# Patient Record
Sex: FEMALE
[De-identification: names, ages, dates, MRNs, and addresses within clinical notes are randomized per-mention and may not be internally consistent; named-entity substitution may affect disease eponyms.]

---
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
## 2020-06-12 ENCOUNTER — HOSPITAL ENCOUNTER (EMERGENCY)
Dept: HOSPITAL 97 - ER | Age: 20
LOS: 1 days | Discharge: HOME | End: 2020-06-13
Payer: COMMERCIAL

## 2020-06-12 DIAGNOSIS — R10.9: ICD-10-CM

## 2020-06-12 DIAGNOSIS — O26.891: Primary | ICD-10-CM

## 2020-06-12 LAB
HCT VFR BLD CALC: 35 % (ref 36–45)
LYMPHOCYTES # SPEC AUTO: 2 K/UL (ref 0.7–4.9)
PMV BLD: 8.7 FL (ref 7.6–11.3)
RBC # BLD: 4.19 M/UL (ref 3.86–4.86)

## 2020-06-12 PROCEDURE — 76817 TRANSVAGINAL US OBSTETRIC: CPT

## 2020-06-12 PROCEDURE — 84702 CHORIONIC GONADOTROPIN TEST: CPT

## 2020-06-12 PROCEDURE — 96361 HYDRATE IV INFUSION ADD-ON: CPT

## 2020-06-12 PROCEDURE — 96360 HYDRATION IV INFUSION INIT: CPT

## 2020-06-12 PROCEDURE — 81003 URINALYSIS AUTO W/O SCOPE: CPT

## 2020-06-12 PROCEDURE — 85025 COMPLETE CBC W/AUTO DIFF WBC: CPT

## 2020-06-12 PROCEDURE — 36415 COLL VENOUS BLD VENIPUNCTURE: CPT

## 2020-06-12 PROCEDURE — 99284 EMERGENCY DEPT VISIT MOD MDM: CPT

## 2020-06-12 PROCEDURE — 86901 BLOOD TYPING SEROLOGIC RH(D): CPT

## 2020-06-12 PROCEDURE — 83690 ASSAY OF LIPASE: CPT

## 2020-06-12 PROCEDURE — 86900 BLOOD TYPING SEROLOGIC ABO: CPT

## 2020-06-12 PROCEDURE — 81025 URINE PREGNANCY TEST: CPT

## 2020-06-12 PROCEDURE — 80048 BASIC METABOLIC PNL TOTAL CA: CPT

## 2020-06-12 NOTE — XMS REPORT
Continuity of Care Document

                            Created on:2020



Patient:VASQUEZ KENNEDY

Sex:Female

:2000

External Reference #:[id number]





Demographics







                          Address                   [address] [place] [address]

 

                          Home Phone                [phone]

 

                          Work Phone                [phone]

 

                          Mobile Phone              [phone]

 

                          Email Address             NONE

 

                          Preferred Language        English

 

                          Marital Status            Unknown

 

                          Amish Affiliation     Unknown

 

                          Race                      Unknown

 

                          Additional Race(s)        Unavailable



                                                    White

 

                          Ethnic Group              Unknown









Author







                          Organization              [facility]

t

 

                          Address                   [address]



                                                    [place] [address]

 

                          Phone                     [phone]









Care Team Providers







                    Name                Role                Phone

 

                    Maura Perez MD    Attending Clinician [phone]

 

                    GIANNI Landa       Attending Clinician [phone]

 

                    Doctor Unassigned,  Name Attending Clinician Unavailable

 

                    GIANNI Burkett MD       Attending Clinician [phone]









Problems

This patient has no known problems.



Allergies, Adverse Reactions, Alerts

This patient has no known allergies or adverse reactions.



Medications

This patient has no known medications.



Procedures

This patient has no known procedures.



Encounters







        Start   End     Encounter Admission Attending Care    Care    Encounter 

Source



        Date/Time Date/Time Type    Type    Clinicians Facility Department ID   

   

 

        2020 Telemedici         Maura Perez [facility]    [id number]

[id number] 



        08:17:40 14:57:25 ne Visit         Judah Damon [id number]         



                                                [place] [id number]         



                                                [facility] [phone]         



                                                [address]                 

 

        2020 Emergency         GALEN Farley    [id number]

[id number] 



        22:56:55 01:29:00                 Kandis Damon [id number]         



                                                [place] [id number]         



                                                [place]  [phone]         



                                                        4             

 

        2020 Orders          Doctor DEGROOT    [id number]

[id number] 



        00:00:00 00:00:00 Only            UnassignedAMMON   [id number]       

  



                                        [place] [id number]         



                                                        [phone]         



                                                        009             

 

        2019 Emergency         GALEN Burkett    [id number]

[id number] 



        08:59:24 11:18:00                 Danny Damon [id number]         



                                                [place] [id number]         



                                                [place]  [phone]         



                                                        084             







Results

This patient has no known results.

## 2020-06-12 NOTE — XMS REPORT
Summary of Care

                            Created on:2020



Patient:Moni Zuñiga

Sex:Female

:2000

External Reference #:[id number]





Demographics







                          Address                   [address] [place] [id number]

 

                          Mobile Phone              [phone]

 

                          Home Phone                [phone]

 

                          Email Address             [email or web address]

 

                          Preferred Language        English

 

                          Marital Status            Single

 

                          Zoroastrianism Affiliation     Unknown

 

                          Race                      White

 

                          Ethnic Group               or 









Author







                          Organization              [facility]

 

                          Address                   [address] [place] [id number]









Care Team Providers







                    Name                Role                Phone

 

                    Pcp,  Does Not Have A Primary Care Provider [phone]









Encounter Details







             Date         Type         Department   Care Team    Description

 

                    2020          Orders Only         [facility]



                          Doctor Unassigned, No     



                                                            [address] [facility]



                                        Name



                                        



                                                [place] [id number] [place] [address] 







Allergies

No Known Allergiesdocumented as of this encounter (statuses as of 2020)



Medications







          Medication Sig       Dispensed Refills   Start Date End Date  Status

 

          ondansetron (ZOFRAN) 4 Take 1 tablet by 12 tablet 0         2019

           Active



          mg tabletIndications: mouth every 8                                   

      



          Non-intractable (eight) hours as                                      

   



          vomiting with nausea, needed for Nausea                               

          



          unspecified vomiting and Vomiting                                     

    



          type, Dizziness (N/V).                                            

 

          meclizine 25 mg Take 1 tablet by 20 tablet 0         2019       

    Active



          tabletIndications: mouth every 6                                      

   



          Non-intractable (six) hours.                                         



          vomiting with nausea,                                                 

  



          unspecified vomiting                                                  

 



          type, Dizziness                                                   



documented as of this encounter (statuses as of 2020)



Active Problems

Not on filedocumented as of this encounter (statuses as of 2020)



Social History







             Tobacco Use  Types        Packs/Day    Years Used   Date

 

             Never Smoker                                        









                          Sex Assigned at Birth     Date Recorded

 

                          Not on file               









                    Job Start Date      Occupation          Industry

 

                    Not on file         Not on file         Not on file









                    Travel History      Travel Start        Travel End









                                        No recent travel history available.



documented as of this encounter



Last Filed Vital Signs

Not on filedocumented in this encounter



Plan of Treatment







                Health Maintenance Due Date        Last Done       Comments

 

                HEPATITIS B VACCINES (1 of 3 - 2000                      



                3-dose primary series)                                 

 

                HEPATITIS A VACCINES (1 of 2 - 2001                      



                2-dose series)                                  

 

                MMR VACCINES (1 of 2 - Standard 2001                      



                series)                                         

 

                DTaP,Tdap,and Td Vaccines (1 - 2007                      



                Tdap)                                           

 

                MENINGOCOCCAL B VACCINES (1 of 2 - 2010                   

   



                Risk Bexsero 2-dose series)                                 

 

                VARICELLA VACCINES (1 of 2 - 13+ 2013                     

 



                2-dose series)                                  

 

                HPV VACCINES (1 - Female 3-dose 2015                      



                series)                                         

 

                CHLAMYDIA SCREENING 2016                      

 

                MENINGOCOCCAL VACCINE (1 - 2-dose 2016                    

  



                series)                                         

 

                INFLUENZA VACCINE (#1) 2019                      

 

                IPV VACCINES    Aged Out                        No longer eligib

le based on



                                                                patient's age to

 complete



                                                                this topic

 

                PNEUMOCOCCAL 0-64 YEARS COMBINED Aged Out                       

 No longer eligible based on



                SERIES                                          patient's age to

 complete



                                                                this topic



documented as of this encounter



Procedures







             Procedure Name Priority     Date/Time    Associated Diagnosis Comme

nts

 

             CONSENT/REFUSAL FOR Routine      2020 10:48 PM CDT           

   



             DIAGNOSIS AND TREATMENT                                        



documented in this encounter



Results

Not on filedocumented in this encounter

## 2020-06-12 NOTE — XMS REPORT
Summary of Care

                            Created on:2020



Patient:Moni Zuñiga

Sex:Female

:2000

External Reference #:[id number]





Demographics







                          Address                   [address] [place] [address]

 

                          Mobile Phone              [phone]

 

                          Home Phone                [phone]

 

                          Phone                     [phone]

 

                          Email Address             [email or web address]

 

                          Preferred Language        English

 

                          Marital Status            Single

 

                          Hindu Affiliation     Unknown

 

                          Race                      White

 

                          Ethnic Group               or 









Author







                          Organization              [facility]

 

                          Address                   [address] [place] [address]









Care Team Providers







                    Name                Role                Phone

 

                    Pcp,  Does Not Have A Primary Care Provider [phone]









Reason for Referral

MRI/CAT Scan (STAT)





           Status     Reason     Specialty  Diagnoses / Referred By Referred To



                                            Procedures Contact    Contact

 

                New Request                     Diagnostic      Diagnoses



Abdominal pain, unspecified abdominal location Kandis Farley,           



                                                Radiology       



Procedures



CT ABDOMEN PELVIS W CONTRAST            PAC



                                        



                                                       [address] [place] [address] 



                                                                 



                                        



                                                       [facility] [place] [place] 



                                                                 [address]



                                        



                                                       Phone:     



                                                                 [phone]



                                        



                                                       Fax:       



                                                       [phone] 









Reason for Visit







                          Reason                    Comments

 

                          Vomiting                  



Auth/Cert





           Status     Reason     Specialty  Diagnoses / Referred By Referred To



                                            Procedures Contact    Contact

 

                                 Emergency Medicine                       [facility]

ergency



                                                                  Dept







                                                                  [address] [place] [address]



                                                                  







                                                                  [place]



                                                                  [address]







                                                                  Phone:



                                                                  [phone]







                                                                  Fax: [phone]









Encounter Details







             Date         Type         Department   Care Team    Description

 

             2020 - Emergency    [facility] Kandis Farley, Non-intract

able vomiting 

with nausea, unspecified vomiting type (Primary Dx);



                    2020                              Department



                                        PAC



                                        Abdominal pain, unspecified abdominal lo

cation;



                                                            [address] [place] [address] 



                                        Nausea and vomiting in adult patient;



                                                            [place] [address]



                                        [place] [address]



                                        Enteritis



                                                [phone] (Fax) 







Allergies

No Known Allergiesdocumented as of this encounter (statuses as of 2020)



Medications







          Medication Sig       Dispensed Refills   Start Date End Date  Status

 

          ondansetron (ZOFRAN) Take 1 tablet by 12 tablet 0         2019  

         Active



          4 mg      mouth every 8                                         



          tabletIndications: (eight) hours as                                   

      



          Non-intractable needed for Nausea                                     

    



          vomiting with nausea, and Vomiting (N/V).                             

            



          unspecified vomiting                                                  

 



          type, Dizziness                                                   

 

          meclizine 25 mg Take 1 tablet by 20 tablet 0         2019       

    Active



          tabletIndications: mouth every 6 (six)                                

         



          Non-intractable hours.                                            



          vomiting with nausea,                                                 

  



          unspecified vomiting                                                  

 



          type, Dizziness                                                   

 

          ondansetron (ZOFRAN) Take 1 tablet by 12 tablet 0         2020  

         Active



          4 mg      mouth every 8                                         



          tabletIndications: (eight) hours as                                   

      



          Nausea and vomiting needed for Nausea                                 

        



          in adult patient and Vomiting (N/V).                                  

       

 

          dicyclomine 20 mg Take 1 tablet by 20 tablet 0         2020     

      Active



          tabletIndications: mouth every 6 (six)                                

         



          Abdominal pain, hours as needed for                                   

      



          unspecified abdominal Abdominal pain.                                 

        



          location                                                    

 

          traMADol (ULTRAM) 50 Take 1 tablet by 20 tablet 0         2020  

         Active



          mg tabletIndications: mouth every 6 (six)                             

            



          Abdominal pain, hours as needed for                                   

      



          unspecified abdominal Pain (scale 7-10).                              

           



          location                                                    



documented as of this encounter (statuses as of 2020)



Active Problems

No known active problemsdocumented as of this encounter (statuses as of 
2020)



Social History







             Tobacco Use  Types        Packs/Day    Years Used   Date

 

             Never Smoker                                        









                          Sex Assigned at Birth     Date Recorded

 

                          Not on file               









                    Job Start Date      Occupation          Industry

 

                    Not on file         Not on file         Not on file









                    Travel History      Travel Start        Travel End









                                        No recent travel history available.



documented as of this encounter



Last Filed Vital Signs







                Vital Sign      Reading         Time Taken      Comments

 

                Blood Pressure  111/64          2020  1:27 AM CDT 

 

                Pulse           90              2020  1:27 AM CDT 

 

                Temperature     37.3 C (99.1 F) 2020 10:58 PM CDT 

 

                Respiratory Rate 16              2020  1:27 AM CDT 

 

                Oxygen Saturation 99%             2020  1:27 AM CDT 

 

                Inhaled Oxygen Concentration -               -               

 

                Weight          99.8 kg (220 lb) 2020 10:58 PM CDT 

 

                Height          160 cm (5' 3")  2020 10:58 PM CDT 

 

                Body Mass Index 38.97           2020 10:58 PM CDT 



documented in this encounter



Discharge Instructions

Danny Johnson MD - 2020Formatting of this note might be 
different from the original.

DIAGNOSIS

Diagnoses that have been ruled out:

None

Diagnoses that are still under consideration:

None

Final diagnoses:

Abdominal pain, unspecified abdominal location

Nausea and vomiting in adult patient

Enteritis



NO LIFE-THREATENING FINDINGS ON TODAY'S EXAM.



PROCEDURES IN THE ER TODAY:

Orders Placed This Encounter

Procedures

 CT ABDOMEN PELVIS W CONTRAST

 Complete Metabolic Panel

 CBC with Differential

 Lipase, Serum

 Urinalysis

 POCT Pregnancy Test

 CBC WITH DIFFERENTIAL



MEDICATIONS ADMINISTERED IN THE ER TODAY AND DISCHARGE MEDICATIONS:

Orders Placed This Encounter

Medications

 sodium chloride (NS) injection 5 mL

 ondansetron (ZOFRAN (PF)) injection 4 mg

 ondansetron (ZOFRAN (PF)) injection 4 mg

 dicyclomine (BENTYL) injection 20 mg

 iohexol (OMNIPAQUE 350 BULK-150 mL) injection 120 mL

 ondansetron (ZOFRAN) 4 mg tablet

 dicyclomine 20 mg tablet

 traMADol (ULTRAM) 50 mg tablet



FOLLOW-UP RECOMMENDATIONS:

RECOMMEND FOLLOW-UP WITH A PRIMARY CARE PROVIDER OR SPECIALIST IN 2-5 DAYS, 
ESPECIALLY IF NO IMPROVEMENT IN SYMPTOMS.



MAY FOLLOW-UP WITH A PROVIDER OF YOUR CHOICE, SUCH AS:

1. A PHYSICIAN OF YOUR CHOICE

2. [facility], [phone]. LOCATIONS IN [facility]

3. [facility], [address] [place] [address]; 
[phone]



OR, IF YOU WISH TO FOLLOW-UP WITHIN THE [facility] HEALTHCARE SYSTEM, MAY TRY THESE 
OPTIONS (CLINIC APPOINTMENTS AVAILABLE ON CASE-BY-CASE BASIS):

1. SCHEDULE AN APPOINTMENT ONLINE AT WWW.[facility]

2. OR CALL THE [facility] ACCESS CENTER AT [phone] OR [phone]

3. OR CALL YOUR [facility] PHYSICIAN'S OFFICE DIRECTLY IF YOU ARE ALREADY AN 
ESTABLISHED [facility] PATIENT.



RETURN TO ER FOR WORSENING OF SYMPTOMS



documented in this encounter



Plan of Treatment







             Name         Type         Priority     Associated Diagnoses Date/Ti

me

 

             CT ABDOMEN PELVIS W IMAGING      STAT         Abdominal pain,  12:48 AM



             CONTRAST                               unspecified abdominal CDT



                                                    location     









                Health Maintenance Due Date        Last Done       Comments

 

                VARICELLA VACCINES (1 of 2 - 2-dose 2001                  

    



                childhood series)                                 

 

                MENINGOCOCCAL B VACCINES (1 of 2 - 2010                   

   



                Risk Bexsero 2-dose series)                                 

 

                DTaP,Tdap,and Td Vaccines (1 - 2011                      



                Tdap)                                           

 

                HPV VACCINES (1 - Female 2-dose 2011                      



                series)                                         

 

                WELL CARE VISIT: 12-21 YEARS 2012                      



                (yearly)                                        

 

                CHLAMYDIA SCREENING 2016                      

 

                INFLUENZA VACCINE (#1) 2019                      

 

                MENINGOCOCCAL VACCINE Aged Out                        No longer 

eligible based on



                                                                patient's age to

 complete



                                                                this topic

 

                PNEUMOCOCCAL 0-64 YEARS COMBINED Aged Out                       

 No longer eligible based on



                SERIES                                          patient's age to

 complete



                                                                this topic



documented as of this encounter



Procedures







             Procedure Name Priority     Date/Time    Associated Diagnosis Comme

nts

 

             CBC WITH DIFFERENTIAL STAT         2020 11:07 Non-intractable

 Results for this



                                       PM CDT       vomiting with procedure are 

in



                                                    nausea, unspecified the resu

lts



                                                    vomiting type section.

 

             URINALYSIS   STAT         2020 11:07 Non-intractable Results 

for this



                                       PM CDT       vomiting with procedure are 

in



                                                    nausea, unspecified the resu

lts



                                                    vomiting type section.

 

             CBC WITH DIFFERENTIAL Routine      2020 11:07 Non-intractable

 Results for this



                                       PM CDT       vomiting with procedure are 

in



                                                    nausea, unspecified the resu

lts



                                                    vomiting type section.

 

             COMP. METABOLIC PANEL STAT         2020 11:07 Non-intractable

 Results for this



             ([id number])                   PM CDT       vomiting with procedure are 

in



                                                    nausea, unspecified the resu

lts



                                                    vomiting type section.

 

             LIPASE       STAT         2020 11:07 Non-intractable Results 

for this



                                       PM CDT       vomiting with procedure are 

in



                                                    nausea, unspecified the resu

lts



                                                    vomiting type section.

 

             POCT PREGNANCY TEST Routine      2020 11:04 Non-intractable R

esults for this



                                       PM CDT       vomiting with procedure are 

in



                                                    nausea, unspecified the resu

lts



                                                    vomiting type section.



documented in this encounter



Results

CBC WITH DIFFERENTIAL (2020 11:07 PM CDT)





             Component    Value        Ref Range    Performed At Pathologist Sig

nature

 

             WBC          11.44 (H)    4.30 - 11.10 [facility] 



                                       10*3/L     hospitals LABORATORY 

 

             RBC          4.41         3.93 - 5.25  [facility] 



                                       10*6/L     HOSPITAL LABORATORY 

 

             HGB          12.3         11.6 - 15.0  [facility] 



                                       g/dL         hospitals LABORATORY 

 

             HCT          37.5         35.7 - 45.2 % [facility] LABORATORY 

 

             MCV          85.0         80.6 - 95.5 fL [facility] LABORATORY 

 

             MCH          27.9         25.9 - 32.8 pg [facility] LABORATORY 

 

             MCHC         32.8         31.6 - 35.1  [facility] 



                                       g/dL         HOSPITAL LABORATORY 

 

             RDW-SD       39.7         39.0 - 49.9 fL [facility] LAB[facility]AT[facility] 

 

             RDW-CV       12.8         12.0 - 15.5 % [facility] LAB[facility] 

 

             PLT          272          166 - 358    [facility] 



                                       10*3/L     HOSPITAL LABORATORY 

 

             MPV          10.6         9.5 - 12.9 fL [facility] LABORATORY 

 

             NRBC/100 WBC 0.0          0.0 - 10.0 /100 [facility] 



                                       WBCs         hospitals LABORATORY 

 

             NRBC x10^3   <0.01        10*3/L     [facility] LABORATORY 

 

             GRAN MAT (NEUT) % 67.8         %            [facility] LABORATORY 

 

             IMM GRAN %   0.50         %            [facility] LAB[facility]AT[facility] 

 

             LYMPH %      23.9         %            [facility] 

 

             MONO %       6.4          %            [facility] LAB[facility] 

 

             EOS %        1.0          %            [facility] LAB[facility] 

 

             BASO %       0.4          %            [facility] LABORAT[facility] 

 

             GRAN MAT x10^3(ANC) 7.75 (H)     1.88 - 7.09  [facility] 



                                       10*3/uL      hospitals LABORATORY 

 

             IMM GRAN x10^3 0.06         0.00 - 0.06  [facility] 



                                       10*3/uL      hospitals LABORATORY 

 

             LYMPH x10^3  2.73         1.32 - 3.29  [facility] 



                                       10*3/uL      hospitals LABORATORY 

 

             MONO x10^3   0.73         0.33 - 0.92  [facility] 



                                       10*3/uL      hospitals LABORATORY 

 

             EOS x10^3    0.12         0.03 - 0.39  [facility] 



                                       10*3/uL      hospitals LABORATORY 

 

             BASO x10^3   0.05         0.01 - 0.07  [facility] 



                                       10*3/uL      hospitals LABORATORY 









                                        Specimen

 

                                        Blood - VENOUS









                Performing Organization Address         City/State/Zipcode Phone

 Number

 

                [facility] CLIA: [id number], [address] [place],  [address] [phone]



                LABORATORY      [address]                  



Urinalysis (2020 11:07 PM CDT)





             Component    Value        Ref Range    Performed At [facility]ologist [facility]

nature

 

             APPEARANCE   Clear        Clear        [facility] 



                                                    LABORATORY   

 

             COLOR        Yellow       Yellow       [facility] 



                                                    LABORATORY   

 

             PH           6.0          4.8 - 8.0    [facility] 



                                                    LABORATORY   

 

             SP GRAVITY   1.019        1.003 - 1.030 [facility] 



                                                    LABORATORY   

 

             GLU U QUAL   Normal       Normal       [facility] 



                                                    LABORATORY   

 

             BLOOD        1+ (A)       Negative     [facility] 



                                                    LABORATORY   

 

             KETONES      Negative     Negative     [facility] 



                                                    LABORATORY   

 

             PROTEIN      Negative     Negative     [facility] 



                                                    LABORATORY   

 

             UROBILIN     Normal       Normal       [facility] 



                                                    LABORATORY   

 

             BILIRUBIN    Negative     Negative     [facility] 



                                                    LABORATORY   

 

             NITRITE      Negative     Negative     [facility] 



                                                    LABORATORY   

 

             LEUK MARLENE   Negative     Negative     [facility] 



                                                    LABORATORY   

 

             RBC/HPF      5 (H)        0 - 3 HPF    [facility] 



                                                    LABORATORY   

 

             WBC/HPF      4            0 - 5 HPF    [facility] 



                                                    LABORATORY   

 

             BACTERIA     Few (A)      Negative     [facility] 



                                                    LABORATORY   

 

             MUCOUS       Slight (A)   Negative LPF [facility] 



                                                    LABORATORY   

 

             SQ EPITH     1            HPF          [facility] 



                                                    LABORATORY   









                                        Specimen

 

                                        Urine - URINE, CLEAN CATCH









                Performing Organization Address         City/[facility] Phone

 Number

 

                [facility] CLIA: [id number], [address] [place], [place] [address]

[address] [phone]



                LABORATORY      [address]                  



Lipase, Serum (2020 11:07 PM CDT)





             Component    Value        Ref Range    Performed At [facility]ologist [facility]

nature

 

             LIPASE       49           0 - 220 U/L  [facility] 



                                                    LABORATORY   









                                        Specimen

 

                                        Blood - VENOUS









                Performing Organization Address         City/[facility] Phone

 Number

 

                [facility] CLIA: [id number], [address] [place] [address]

15 [phone]



                LABORATORY      [address]                  



Complete Metabolic Panel (2020 11:07 PM CDT)





             Component    Value        Ref Range    Performed At Pathologist [facility]

nature

 

             NA           138          135 - 145 mmol/L [facility] LABORATORY 

 

             K            4.1          3.5 - 5.0 mmol/L [facility] LABORAT[facility] 

 

             CL           105          98 - 108 mmol/L [facility] LAB[facility] 

 

             CO2 TOTAL    24           23 - 31 mmol/L [facility] LABORATORY 

 

             AGAP         9            2 - 16       [facility] LABORATORY 

 

             BUN          19           7 - 23 mg/dL [facility] LAB[facility]AT[facility] 

 

             GLUCOSE      95           70 - 110 mg/dL [facility] LABORATORY 

 

             CREATININE   0.79         0.50 - 1.04  [facility] LABORATORY 

 

             TOTAL BILI   0.2          0.1 - 1.1 mg/dL [facility] LABORATORY 

 

             CALCIUM      9.1          8.6 - 10.6 mg/dL [facility] LABORATORY 

 

             T PROTEIN    7.6          6.3 - 8.2 g/dL [facility] LAB[facility]AT[facility] 

 

             ALBUMIN      4.5          3.5 - 5.0 g/dL [facility] LABORAT[facility] 

 

             ALK PHOS     84           34 - 122 U/L [facility] LABORATORY 

 

             ALTv         20           5 - 35 U/L   [facility] LABORATORY 

 

             AST(SGOT)    31           13 - 40 U/L  [facility] LABORATORY 

 

             eGFR Calculation 93.8         mL/min/1.73m2 [facility] 



             (Non-)                           hospitals LABORATOR

Y 

 

             eGFR Calculation 113.6        mL/min/1.73m2 [facility] 



             (African American)                           hospitals LABORATORY 









                                        Specimen

 

                                        Blood - VENOUS









                          Narrative                 Performed At

 

                          Association of Glomerular Filtration Rate (GFR) [facility] 

LABORATORY



                                        and Staging of Kidney Disease*



                                        



                                         



                                        



                          +-----------------------+---------------------+- 



                                        ------------------------+



                                        



                          | GFR (mL/min/1.73 m2) | With Kidney Damage 



                                        | Without Kidney Damage



                                        



                          +-----------------------+---------------------+- 



                                        ------------------------+



                                        



                          | >90         | Stage one 



                              |  Normal        



                                        



                                        



                          +-----------------------+---------------------+- 



                                        ------------------------+



                                        



                          | 60-89        | Stage two 



                                            |  Decreased GFR   

  



                                        



                          +-----------------------+---------------------+- 



                                        ------------------------+



                                        



                          | 30-59        | Stage three 



                                           |  Stage three     

 



                                        



                          +-----------------------+---------------------+- 



                                        ------------------------+



                                        



                          | 15-29        | Stage four 



                                            |  Stage four     

 



                                        



                          +-----------------------+---------------------+- 



                                        ------------------------+



                                        



                          | <15 (or dialysis)  | Stage five   



                                          |  Stage five      



                                        



                          +-----------------------+---------------------+- 



                                        ------------------------+



                                        



                                         



                                        



                          *Each stage assumes the associated GFR level has 



                          been in effect for at least three months. 



                          Stages 1 to 5, with or without kidney disease, 



                                        indicate chronic kidney disease.



                                        



                                         



                                        



                          Notes: Determination of stages one and two (with 



                          eGFR >59mL/min/1.73 m2) requires estimation of 



                          kidney damage for at least three months as 



                          defined by structural or functional 



                          abnormalities of the kidney, manifested by 



                                        either:



                                        



                          Pathological abnormalities or Markers of kidney 



                          damage (including abnormalities in the 



                          composition of the blood or urine or 



                                        abnormalities in imaging tests). 



                                        



                                                    









                Performing Organization Address         City/State/Zipcode Phone

 Number

 

                [facility] CLIA: [id number], [address] [address] 15 [phone]



                [facility]                  



POCT Pregnancy Test (2020 11:04 PM CDT)





             Component    Value        Ref Range    Performed At Pathologist Sig

nature

 

             POCT PREG    negative                               

 

             On board controls acceptable positive                              

 



             with C Line                                         

 

             POCT PREG LOT # [id number]                             

 

             POCT PREG TEST  DATE 2021                             









                                        Specimen

 

                                        Urine - URINE, CLEAN CATCH



documented in this encounter



Visit Diagnoses







                                        Diagnosis

 

                                        Non-intractable vomiting with nausea, un

specified vomiting type - Primary

 

                                        Abdominal pain, unspecified abdominal lo

cation

 

                                        Enteritis







                                        Other and unspecified noninfectious shanthi

roenteritis and colitis



documented in this encounter



Administered Medications







           Medication Order MAR Action Action Date Dose       Rate       Site

 

                                        sodium chloride (NS) injection 5 mL



             Given        2020 11:07 PM CDT 5 mL                      



           5 mL, Intravenous, PRN, Starting                                     

        



           Tue 3/24/20 at 2259, Until                                           

  



           Discontinued, Routine, IV line                                       

      



           flushing                                               









                                                    









                                        









           Medication Order MAR Action Action Date Dose       Rate       Site

 

           dicyclomine (BENTYL) Given      2020 11:32 PM 20 mg            

     Right



                                        injection 20 mg



                          CDT                                    Dorsogluteal-IM



           20 mg, Intramuscular,                                             



           ONCE, 1 dose, Tue 3/24/20                                            

 



           at 2330, Routine                                             









                                                    









           iohexol (OMNIPAQUE 350 BULK-150 mL) Given      2020  1:00 AM CD

T 120 mL                



                                        injection 120 mL



                                                                 



           120 mL, Intravenous, ONCE, 1 dose, Wed                               

              



           3/25/20 at 0100, Routine                                             









                                                    









                                        ondansetron (ZOFRAN (PF)) injection 4 mg



             Given        2020 11:07 PM CDT 4 mg                      



           4 mg, Slow IV Push, ONCE, 1 dose, Wed 3/25/20                        

                     



           at 0015, ASAP                                             









                                                    









                                        ondansetron (ZOFRAN (PF)) injection 4 mg



             Given        2020 11:22 PM CDT 4 mg                      



           4 mg, Slow IV Push, ONCE, 1 dose, Wed 3/25/20                        

                     



           at 0030, ASAP                                             









                                                    



documented in this encounter
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
## 2020-06-13 VITALS — TEMPERATURE: 97.8 F | OXYGEN SATURATION: 100 %

## 2020-06-13 VITALS — SYSTOLIC BLOOD PRESSURE: 103 MMHG | DIASTOLIC BLOOD PRESSURE: 47 MMHG

## 2020-06-13 LAB
BUN BLD-MCNC: 11 MG/DL (ref 7–18)
GLUCOSE SERPLBLD-MCNC: 90 MG/DL (ref 74–106)
HCG SERPL-ACNC: (no result) MIU/ML (ref 1–3)
LIPASE SERPL-CCNC: 76 U/L (ref 73–393)
POTASSIUM SERPL-SCNC: 3.8 MMOL/L (ref 3.5–5.1)

## 2020-06-13 NOTE — ER
Nurse's Notes                                                                                     

 [facility]                                                                 

Name: Moni Zuñiga                                                                              

Age: 19 yrs                                                                                       

Sex: Female                                                                                       

: 2000                                                                                   

MRN: [id number]                                                                                   

Arrival Date: 2020                                                                          

Time: 22:49                                                                                       

Account#: [id number]                                                                            

Bed 17                                                                                            

Private MD:                                                                                       

Diagnosis: Pregnancy related conditions, unspecified, first trimester;Unspecified abdominal pain  

                                                                                                  

Presentation:                                                                                     

                                                                                             

23:00 Chief complaint: Patient states: Lower abd pain that began this morning, pt reports     sg  

      pain and cramping increasing this evening, denies N/VD/Fever at this time, reports          

      vaginal bleeding that is spotty. Coronavirus screen: Proceed with normal triage. Ebola      

      Screen: Patient negative for fever greater than or equal to 101.5 degrees Fahrenheit,       

      and additional compatible Ebola Virus Disease symptoms Patient denies exposure to           

      infectious person. Patient denies travel to an Ebola-affected area in the 21 days           

      before illness onset. No symptoms or risks identified at this time. Initial Sepsis          

      Screen: Does the patient meet any 2 criteria? No. Patient's initial sepsis screen is        

      negative. Does the patient have a suspected source of infection? No. Patient's initial      

      sepsis screen is negative. Risk Assessment: Do you want to hurt yourself or someone         

      else? Patient reports no desire to harm self or others. Onset of symptoms was 2020. Care prior to arrival: None. Transition of care: patient was not received from        

      another setting of care.                                                                    

23:00 Method Of Arrival: Ambulatory                                                             

23:00 Acuity: SKYLA 3                                                                           sg  

                                                                                                  

OB/GYN:                                                                                           

23:03  1, Full Term 0, Premature 0,  0, Living 0, LMP 2020                  

23:28  1, Full Term 0,  0, Living 0, LMP 2020, Pregnancy Verified, EDC    cp  

      2021, Gestational age from LMP: 8 weeks 1 day                                          

                                                                                                  

Historical:                                                                                       

- Allergies:                                                                                      

23:02 No Known Allergies;                                                                     sg  

- Home Meds:                                                                                      

23:02 Prenatal Vitamin Oral [Active];                                                         sg  

- PMHx:                                                                                           

                                                                                             

00:26 None;                                                                                   sg  

- PSHx:                                                                                           

                                                                                             

23:02 None;                                                                                   sg  

                                                                                                  

- Immunization history:: Adult Immunizations.                                                     

- Social history:: Smoking status: Patient denies any tobacco usage or history of.                

                                                                                                  

                                                                                                  

Screenin:30 Abuse screen: Denies threats or abuse. Denies injuries from another. Nutritional          

      screening: No deficits noted. Tuberculosis screening: No symptoms or risk factors           

      identified. Fall Risk None identified.                                                      

                                                                                                  

Assessment:                                                                                       

23:30 General: Appears in no apparent distress. Behavior is calm, cooperative, appropriate    wh  

      for age. Pain: Complains of pain in umbilical area Pain does not radiate. Pain              

      currently is 2 out of 10 on a pain scale. Neuro: Level of Consciousness is awake,           

      alert, obeys commands, Oriented to person, place, time, situation, Appropriate for age.     

      Cardiovascular: Heart tones S1 S2. Respiratory: Airway is patent Respiratory effort is      

      even, unlabored, Respiratory pattern is regular, symmetrical, Breath sounds are clear       

      bilaterally. GI: Abdomen is flat, non-distended, Bowel sounds present X 4 quads. Abd is     

      soft and non tender X 4 quads. Reports lower abdominal pain. : No signs and/or            

      symptoms were reported regarding the genitourinary system. EENT: No signs and/or            

      symptoms were reported regarding the EENT system. Derm: Skin is intact, is healthy with     

      good turgor, Skin is pink, warm \T\ dry. normal. Musculoskeletal: Circulation, motion,      

      and sensation intact.                                                                       

                                                                                             

01:00 Reassessment: Patient appears in no apparent distress at this time. No changes from       

      previously documented assessment. Patient and/or family updated on plan of care and         

      expected duration. Pain level reassessed. Patient is alert, oriented x 3, equal             

      unlabored respirations, skin warm/dry/pink.                                                 

                                                                                                  

Vital Signs:                                                                                      

                                                                                             

23:00 Pulse 87; Resp 18; Temp 97.8; Pulse Ox 100% on R/A; Pain 7/10;                          sg  

                                                                                             

01:00  / 47; Pulse 86; Resp 18; Pulse Ox 100% on R/A;                                     

                                                                                                  

ED Course:                                                                                        

                                                                                             

22:49 Patient arrived in ED.                                                                  ds1 

23:02 Kane Chester PA is PHCP.                                                                cp  

23:02 Ozzie Pat MD is Attending Physician.                                            cp  

23:02 Triage completed.                                                                       sg  

23:02 Arm band placed on.                                                                     sg  

23:05 Peter Dubose is Primary Nurse.                                                         wh  

23:30 Patient has correct armband on for positive identification. Placed in gown. Bed in low  wh  

      position. Call light in reach. Side rails up X 1. Pulse ox on. NIBP on.                     

23:30 Inserted saline lock: 20 gauge in right antecubital area, using aseptic technique.        

      Blood collected.                                                                            

                                                                                             

01:13 US Transvaginal Ob In Process Unspecified.                                              EDMS

01:24 No provider procedures requiring assistance completed. IV discontinued, intact,           

      bleeding controlled, No redness/swelling at site.                                           

                                                                                                  

Administered Medications:                                                                         

                                                                                             

23:43 Drug: NS 0.9% 1000 ml Route: IV; Rate: 1000 ml/hr; Site: right antecubital;               

                                                                                             

01:19 Follow up: Response: No adverse reaction; IV Status: Completed infusion                   

                                                                                                  

                                                                                                  

Outcome:                                                                                          

01:09 Discharge ordered by MD.                                                                cp  

01:24 Discharged to home ambulatory, with family.                                               

01:24 Condition: stable                                                                           

01:24 Discharge instructions given to patient, family, Instructed on discharge instructions,      

      follow up and referral plans. POC Demonstrated understanding of instructions, follow-up     

      care, POC                                                                                   

01:25 Patient left the ED.                                                                      

                                                                                                  

Signatures:                                                                                       

Dispatcher [employer]                           EDMS                                                 

Hitesh Vega RN RN sg [place], Demi                                ds1                                                  

Kane Chester PA PA cp Habalo, Winsy                                                                                   

                                                                                                  

**************************************************************************************************

## 2020-06-13 NOTE — RAD REPORT
EXAM DESCRIPTION:  US - Transvaginal OB - 6/13/2020 1:13 am

 

CLINICAL HISTORY:   Pregnancy with pelvic pain

 

COMPARISON:  None.

 

FINDINGS:   The uterus measures 9 x 6 x 5 centimeters. A normal appearing gestational sac is present 
within the endometrium. Within this is a yolk sac and fetal pole with a crown-rump length 1.8 centime
ters. Cardiac activity 169 beats per minute

 

Right and left ovary appear normal. . An adnexal mass is not noted.

 

No significant free fluid is seen.

 

IMPRESSION:   Single live intrauterine pregnancy with an estimated gestational age 8 weeks 1 days ANGELIQUE
 01/22/2021

## 2020-06-13 NOTE — EDPHYS
Physician Documentation                                                                           

 [facility]                                                                 

Name: Moni Zuñiga                                                                              

Age: 19 yrs                                                                                       

Sex: Female                                                                                       

: 2000                                                                                   

MRN: [id number]                                                                                   

Arrival Date: 2020                                                                          

Time: 22:49                                                                                       

Account#: [id number]                                                                            

Bed 17                                                                                            

Private MD:                                                                                       

ED Physician Ozzie Pat                                                                     

HPI:                                                                                              

                                                                                             

23:25 This 19 yrs old  Female presents to ER via Ambulatory with complaints of        cp  

      Abdominal Pain - 9 Wks Preg.                                                                

23:25 The patient presents with abdominal pain in the periumbilical area. Onset: The          cp  

      symptoms/episode began/occurred today. Associated signs and symptoms: Pertinent             

      negatives: dysuria, fever, vomiting, vaginal bleeding, leakage of fluids. Severity of       

      pain: in the emergency department the pain is unchanged despite home interventions.         

                                                                                                  

OB/GYN:                                                                                           

23:03  1, Full Term 0, Premature 0,  0, Living 0, LMP 2020                sg  

23:28  1, Full Term 0,  0, Living 0, LMP 2020, Pregnancy Verified, EDC    cp  

      2021, Gestational age from LMP: 8 weeks 1 day                                          

                                                                                                  

Historical:                                                                                       

- Allergies:                                                                                      

23:02 No Known Allergies;                                                                     sg  

- Home Meds:                                                                                      

23:02 Prenatal Vitamin Oral [Active];                                                         sg  

- PMHx:                                                                                           

                                                                                             

00:26 None;                                                                                   sg  

- PSHx:                                                                                           

                                                                                             

23:02 None;                                                                                   sg  

                                                                                                  

- Immunization history:: Adult Immunizations.                                                     

- Social history:: Smoking status: Patient denies any tobacco usage or history of.                

                                                                                                  

                                                                                                  

ROS:                                                                                              

23:27 Constitutional: Negative for body aches, chills, fever, poor PO intake.                 cp  

23:27 Eyes: Negative for injury, pain, redness, and discharge.                                cp  

23:27 Cardiovascular: Negative for chest pain, palpitations.                                      

23:27 Respiratory: Negative for cough, shortness of breath, wheezing.                             

23:27 Abdomen/GI: Positive for abdominal pain, Negative for nausea, vomiting, and diarrhea.       

23:27 : Negative for urinary symptoms, vaginal bleeding, vaginal discharge.                     

23:27 Neuro: Negative for dizziness, headache.                                                    

23:27 All other systems are negative.                                                         cp  

                                                                                                  

Exam:                                                                                             

23:30 Constitutional: The patient appears in no acute distress, alert, awake, non-toxic, well cp  

      developed, well nourished.                                                                  

23:30 Head/Face:  Normocephalic, atraumatic.                                                  cp  

23:30 Eyes: Periorbital structures: appear normal, Conjunctiva: normal, no exudate, no            

      injection, Sclera: no appreciated abnormality, Lids and lashes: appear normal,              

      bilaterally.                                                                                

23:30 ENT: External ear(s): are unremarkable, Nose: is normal, Mouth: is normal, Posterior        

      pharynx: is normal, airway is patent, no erythema, no exudate.                              

23:30 Chest/axilla: Inspection: normal, Palpation: is normal, no crepitus, no tenderness.         

23:30 Cardiovascular: Rate: normal, Rhythm: regular.                                              

23:30 Respiratory: the patient does not display signs of respiratory distress,  Respirations:     

      normal, no use of accessory muscles, no retractions, labored breathing, is not present,     

      Breath sounds: are clear throughout, no decreased breath sounds.                            

23:30 Abdomen/GI: Inspection: abdomen appears normal, Bowel sounds: active, all quadrants,        

      Palpation: soft, in all quadrants, mild abdominal tenderness, in the umbilical area,        

      rebound tenderness, is not appreciated, voluntary guarding, is not appreciated,             

      involuntary guarding, is not appreciated.                                                   

23:30 Back: pain, is absent, ROM is normal.                                                       

                                                                                                  

Vital Signs:                                                                                      

23:00 Pulse 87; Resp 18; Temp 97.8; Pulse Ox 100% on R/A; Pain 7/10;                          sg  

                                                                                             

01:00  / 47; Pulse 86; Resp 18; Pulse Ox 100% on R/A;                                   wh  

                                                                                                  

MDM:                                                                                              

                                                                                             

23:06 Patient medically screened.                                                             cp  

23:50 Differential diagnosis: appendicitis, Ectopic Pregnancy, non-specific abd pain, Ovarian cp  

      Torsion, Pelvic Inflammatory Disease, Pyelonephritis, urinary tract infection.              

                                                                                             

01:08 Data reviewed: vital signs, nurses notes, lab test result(s), radiologic studies,       cp  

      ultrasound.                                                                                 

01:08 Counseling: I had a detailed discussion with the patient and/or guardian regarding: the cp  

      historical points, exam findings, and any diagnostic results supporting the                 

      discharge/admit diagnosis, lab results, radiology results, to return to the emergency       

      department if symptoms worsen or persist or if there are any questions or concerns that     

      arise at home.                                                                              

                                                                                                  

                                                                                             

23:22 Order name: Urine Dipstick--Ancillary (enter results); Complete Time: 23:46             ar5 

                                                                                             

23:22 Order name: Urine Pregnancy--Ancillary (enter results); Complete Time: 23:46            [facility] 

                                                                                             

23:27 Order name: Quantitative Hcg; Complete Time: 00:29                                        

                                                                                             

00:29 Interpretation: HCGQ 85273; Reviewed.                                                     

                                                                                             

23:27 Order name: Abo/rh Typing; Complete Time: 00:29                                           

                                                                                             

23:27 Order name: Basic Metabolic Panel; Complete Time: 00:29                                   

                                                                                             

00:29 Interpretation: Normal except: .                                                    

                                                                                             

23:27 Order name: CBC with Diff; Complete Time: 00:29                                           

                                                                                             

00:29 Interpretation: Normal except: WBC 11.6; HGB 11.5; HCT 35.0; YENIFER% 75.8; NEUT A 8.8.       

                                                                                             

23:08 Order name: Urine Dipstick-Ancillary (obtain specimen); Complete Time: 23:21              

                                                                                             

23:08 Order name: Urine Pregnancy Test (obtain specimen); Complete Time: 23:21                  

                                                                                             

23:27 Order name: IV Saline Lock; Complete Time: 23:43                                          

                                                                                             

23:27 Order name: Labs collected and sent; Complete Time: 23:43                                 

                                                                                             

23:27 Order name: NPO; Complete Time: 23:43                                                     

                                                                                             

23:27 Order name: Lipase; Complete Time: 00:29                                                  

                                                                                             

00:29 Interpretation: Reviewed.                                                                 

                                                                                             

23:46 Order name: US Transvaginal Ob                                                          cp  

                                                                                                  

Administered Medications:                                                                         

                                                                                             

23:43 Drug: NS 0.9% 1000 ml Route: IV; Rate: 1000 ml/hr; Site: right antecubital;               

                                                                                             

01:19 Follow up: Response: No adverse reaction; IV Status: Completed infusion                   

                                                                                                  

                                                                                                  

Disposition:                                                                                      

03:17 Co-signature as Attending Physician, Ozzie Pat MD I agree with the assessment and [facility] 

      plan of care.                                                                               

                                                                                                  

Disposition:                                                                                      

20 01:09 Discharged to Home. Impression: Pregnancy related conditions, unspecified,         

  first trimester, Unspecified abdominal pain.                                                    

- Condition is Stable.                                                                            

- Discharge Instructions: Abdominal Pain During Pregnancy.                                        

                                                                                                  

- Medication Reconciliation Form, Thank You Letter, Antibiotic Education, Prescription            

  Opioid Use form.                                                                                

- Follow up: Private Physician; When: 2 - 3 days; Reason: Recheck today's complaints.             

- Problem is new.                                                                                 

- Symptoms have improved.                                                                         

                                                                                                  

                                                                                                  

                                                                                                  

Signatures:                                                                                       

Dispatcher [employer]                           EDMS                                                 

Hitesh Vega RN                         RN   Kane Koenig PA PA cp Habalo, Winsy wh Wadley, Terrence, MD MD   [id number]                                                  

                                                                                                  

Corrections: (The following items were deleted from the chart)                                    

01:25 01:09 2020 01:09 Discharged to Home. Impression: Pregnancy related conditions,    wh  

      unspecified, first trimester; Unspecified abdominal pain. Condition is Stable. Forms        

      are Medication Reconciliation Form, Thank You Letter, Antibiotic Education,                 

      Prescription Opioid Use. Follow up: Private Physician; When: 2 - 3 days; Reason:            

      Recheck today's complaints. Problem is new. Symptoms have improved. cp                      

                                                                                                  

**************************************************************************************************

## 2023-02-05 ENCOUNTER — HOSPITAL ENCOUNTER (EMERGENCY)
Dept: HOSPITAL 97 - ER | Age: 23
Discharge: HOME | End: 2023-02-05
Payer: COMMERCIAL

## 2023-02-05 VITALS — OXYGEN SATURATION: 100 % | SYSTOLIC BLOOD PRESSURE: 112 MMHG | DIASTOLIC BLOOD PRESSURE: 84 MMHG

## 2023-02-05 VITALS — TEMPERATURE: 98.5 F

## 2023-02-05 DIAGNOSIS — O26.892: Primary | ICD-10-CM

## 2023-02-05 DIAGNOSIS — Z3A.16: ICD-10-CM

## 2023-02-05 LAB
ALBUMIN SERPL BCP-MCNC: 3.1 G/DL (ref 3.4–5)
ALP SERPL-CCNC: 73 U/L (ref 45–117)
ALT SERPL W P-5'-P-CCNC: 47 U/L (ref 13–56)
AST SERPL W P-5'-P-CCNC: 24 U/L (ref 15–37)
BUN BLD-MCNC: 7 MG/DL (ref 7–18)
GLUCOSE SERPLBLD-MCNC: 104 MG/DL (ref 74–106)
HCT VFR BLD CALC: 37.7 % (ref 36–45)
LIPASE SERPL-CCNC: 94 U/L (ref 73–393)
LYMPHOCYTES # SPEC AUTO: 2.2 K/UL (ref 0.7–4.9)
MCV RBC: 83.9 FL (ref 80–100)
PMV BLD: 9 FL (ref 7.6–11.3)
POTASSIUM SERPL-SCNC: 3.8 MMOL/L (ref 3.5–5.1)
RBC # BLD: 4.49 M/UL (ref 3.86–4.86)
SP GR UR: >1.03 (ref 1–1.03)

## 2023-02-05 PROCEDURE — 99284 EMERGENCY DEPT VISIT MOD MDM: CPT

## 2023-02-05 PROCEDURE — 85025 COMPLETE CBC W/AUTO DIFF WBC: CPT

## 2023-02-05 PROCEDURE — 83690 ASSAY OF LIPASE: CPT

## 2023-02-05 PROCEDURE — 96361 HYDRATE IV INFUSION ADD-ON: CPT

## 2023-02-05 PROCEDURE — 81025 URINE PREGNANCY TEST: CPT

## 2023-02-05 PROCEDURE — 96374 THER/PROPH/DIAG INJ IV PUSH: CPT

## 2023-02-05 PROCEDURE — 76770 US EXAM ABDO BACK WALL COMP: CPT

## 2023-02-05 PROCEDURE — 80053 COMPREHEN METABOLIC PANEL: CPT

## 2023-02-05 PROCEDURE — 81003 URINALYSIS AUTO W/O SCOPE: CPT

## 2023-02-05 PROCEDURE — 96372 THER/PROPH/DIAG INJ SC/IM: CPT

## 2023-02-05 PROCEDURE — 36415 COLL VENOUS BLD VENIPUNCTURE: CPT

## 2023-02-05 NOTE — RAD REPORT
EXAM DESCRIPTION:  US - Renal Ultrasound-Complete - 2/5/2023 8:21 pm

 

CLINICAL HISTORY:  left flank pain, hematuria

 

COMPARISON:  CT ABD PELVIS W CONTRAST dated 9/10/2013

 

FINDINGS:  The right kidney measures grossly 11.5 x 7.2 cm.  The left kidney measures grossly 11.2 x 
6.2 cm. Renal cortical thickness and echogenicity are normal. No hydronephrosis or suspicious renal m
ass.

 

Urinary bladder is only partially filled precluding accurate assessment.

 

 

IMPRESSION:  No hydronephrosis or suspicious renal mass.

 

Urinary bladder is only partially filled limiting assessment.

## 2023-02-05 NOTE — XMS REPORT
Continuity of Care Document

                           Created on:2023



Patient:VASQUEZ KENNEDY

Sex:Female

:2000

External Reference #:[id number]





Demographics







                          Address                   [address] 

[address]



                                                    [place] [address]

 

                          Home Phone                [phone]

 

                          Mobile Phone              [phone]

 

                          Email Address             [email or web address]

 

                          Preferred Language        English

 

                          Marital Status            Unknown

 

                          Rastafarian Affiliation     Unknown

 

                          Race                      Unknown

 

                          Additional Race(s)        White

 

                          Ethnic Group              Unknown









Author







                          Organization              [facility]

t

 

                          Address                   [address]



                                                    [place] [address]

 

                          Phone                     [phone]









Support







                Name            Relationship    Address         Phone

 

                Fadia Kennedy   Mother          [address]   [phone]



                                                [place] [address] 

 

                Sin Jai   Spouse          Unavailable     [phone]









Care Team Providers







                    Name                Role                Phone

 

                    Giovanna Chen    Primary Care Physician [phone]

 

                    RUTHY EL     Attending Clinician Unavailable

 

                    GIOVANNA CLAUDIO        Attending Clinician Unavailable

 

                    Alejandra Finn MD    Attending Clinician [phone]

 

                    Vero Stephens MD Attending Clinician [phone]

 

                    ALEJANDRA FINN       Attending Clinician Unavailable

 

                    UNKNOWN, ATTENDING  Attending Clinician Unavailable

 

                    Pooja Ashley RN    Attending Clinician Unavailable

 

                    2, [facility]          Attending Clinician Unavailable

 

                    TIFFANY SHANKAR  Attending Clinician Unavailable

 

                    Tiffany Keith Attending Clinician [phone]

 

                    Unknown, Attending  Attending Clinician Unavailable

 

                    Giovanna Chen    Attending Clinician [phone]

 

                    Doctor Unassigned, No Name Attending Clinician Unavailable

 

                    Pob, [facility]   Attending Clinician Unavailable

 

                    LELA CONRAD    Attending Clinician Unavailable

 

                    Lela Lynn Attending Clinician [phone]

 

                    Barbie Lopez RN  Attending Clinician Unavailable

 

                    Anayeli Barrera  Attending Clinician [phone]

 

                    ANAYELI PEÑA      Attending Clinician Unavailable

 

                    Lab, Ang - Db       Attending Clinician Unavailable

 

                    ANNMARIE GARVINI   Attending Clinician Unavailable

 

                    Ruthy El PA-C Attending Clinician [phone]

 

                    VERO VILLAGOMEZ III   Attending Clinician Unavailable

 

                    DIANE PYLE   Attending Clinician Unavailable

 

                    Provider, [facility] Attending Clinician Unavailable

 

                    Vasquez Castro Attending Clinician [phone]

 

                    VASQUEZ GALLOWAY   Attending Clinician Unavailable

 

                    YOVANNY Birmingham Attending Clinician [phone]

 

                    Marguerite Hidalgo MD     Attending Clinician [phone]

 

                    Pernell FLOYD, Becki       Attending Clinician Unavailable

 

                    CHARISSA ANGLIN        Attending Clinician Unavailable

 

                    Gi Chavira DO Attending Clinician [phone]

 

                    Alan RN, Kane  Attending Clinician Unavailable

 

                    Garrett Nichols DO Attending Clinician [phone]

 

                    Nurse, [facility] Attending Clinician Unavailable

 

                    , [facility]          Attending Clinician Unavailable

 

                    [facility]    Attending Clinician Unavailable

 

                    Gladys Anthony MD      Attending Clinician [phone]

 

                    Laura VASQUES, Razia ALANIZ Attending Clinician [phone]

 

                    Jose Hernández MD  Attending Clinician [phone]

 

                    Edilia FLOYD, Caroline ALANIZ Attending Clinician Unavailable

 

                    CacCarolin Jose Attending Clinician [phone]

 

                    Danny Burkett MD Attending Clinician [phone]

 

                    Ultrasound, Ang-Mfjerry Attending Clinician Unavailable

 

                    Dell Pillai MD Attending Clinician [phone]

 

                    Leticia Newman   Attending Clinician Unavailable

 

                    Maikol Staton MD    Attending Clinician [phone]

 

                    Lab, [facility] P[facility] I  Attending Clinician Unavailable

 

                    Paige Osuna  Attending Clinician [phone]

 

                    PAIGE FLETCHER      Attending Clinician Unavailable

 

                    Pcp, Patient Does Not Have A Attending Clinician [phone]

[phone]

 

                    Justin Espinal MD, Vega Attending Clinician [phone]

 

                    DUC ALONZO      Attending Clinician Unavailable

 

                    Duc Landa  Attending Clinician [phone]

 

                    ALEJANDRA FINN       Admitting Clinician Unavailable

 

                    GLADYS ANTHONY         Admitting Clinician Unavailable

 

                    Gladys Anthony MD      Admitting Clinician [phone]

 

                    DUC ALONZO      Admitting Clinician Unavailable









Payers







           Payer Name Policy Type Policy Number Effective Date Expiration Date [employer]            [id number]  2020            



           HEALTH                           00:00:00              

 

           [place]            [id number]  [id number]            



                                            00:00:00              







Problems







       Condition Condition Condition Status Onset  Resolution Last   Treating Co

mments 

Source



       Name   Details Category        Date   Date   Treatment Clinician        



                                                 Date                 

 

       Previous Previous Disease Active 2022                             Unive

rs



                       2-29                               ity of



       section section               00:00:                             [place]



                                                                    Medical



                                                                      Branch

 

       Urinary Urinary Disease Active 2022                             [facility]



       frequency frequency               2-29                               ity 

of



                                   00:00:                             [address]



                                                                      Branch

 

       Melasma Melasma Disease Active 2022                             Univers



       gravidarum gravidarum               2-29                               it

y of



       ,      ,                    00:00:                             [place]



       antepartum antepartum               00                                 Me

dical



                                                                      Branch

 

       Vaginal Vaginal Disease Active 2022                             Univers



       dryness dryness               2-                               ity of



                                   00:00:                             [place]



                                   00                                 Medical



                                                                      Branch

 

       History of History of Disease Active 2022                             U

nivers



       cholestasi cholestasi               2-29                               it

y of



       s during s during               00:00:                             [place]



       pregnancy pregnancy               00                                 [facility]

 

       High-risk High-risk Disease Active 2022                             Uni

vers



       pregnancy pregnancy               2-02                               ity 

of



       in first in first               00:00:                             [place]



       trimester trimester               00                                 [facility]

 

       Anxiety Anxiety Disease Active                              Univers



       and    and                  8-16                               ity of



       depression depression               00:00:                             [place]

[place]



                                                                    [facility]

 

       Morbid Morbid Disease Active 2020-0                             Univers



       obesity obesity               9-14                               ity of



       with body with body               00:00:                             [place]

[place]



       mass index mass index               00                                 Me

dical



       [facility]                                                      Branch



       40.0-49.9 40.0-49.9                                                  

 

       Family Family Disease Active 2020-                             Univers



       history of history of               7-17                               it

y of



       cancer cancer               00:00:                             [address]

 

       Family Family Disease Active 2020-0                             Univers



       history of history of               7-17                               it

y of



       congenital congenital               00:00:                             [place]

[place]



       heart  heart                00                                 Medical



       defect defect                                                  Branch







Allergies, Adverse Reactions, Alerts







       Allergy Allergy Status Severity Reaction(s) Onset  Inactive Treating Comm

ents 

Source



       Name   Type                        Date   Date   Clinician        

 

       NO KNOWN Drug   Active                                           Univers



       ALLERGIE Class                                                   ity of



       S                                                              [facility]







Social History







           Social Habit Start Date Stop Date  Quantity   Comments   Source

 

           ASSERTION  2022-10-30            Pregnant              University of



                      00:00:00                                    [facility]

 

           History SDOH                                             University o

f



           Alcohol Frequency                                             [facility]

edical



                                                                  Branch

 

           History [facility] o

f



           Alcohol Std                                             [place] [facility]



           Drinks                                                 Branch

 

           History [facility] o

f



           Alcohol Binge                                             [place] [facility]

al



                                                                  Branch

 

           History of                       Passive smoker            University

 of



           tobacco use                                             [facility]

 

           Exposure to 2023 Not sure              University of



           SARS-CoV-2 00:00:00   11:38:00                         [facility]



           (event)                                                Branch

 

           Alcohol intake 2023 Ex-drinker            University

 



                      00:00:00   00:00:00   (finding)             [facility]

 

           Tobacco use and 2022 Smokeless tobacco            Un

iversity of



           exposure   00:00:00   00:00:00   non-user              [facility]

 

           Alcohol Comment 2022 occasional            Universit

y of



                      00:00:00   00:00:00                         [facility]

 

           Sex Assigned At 2000                       Universit

y of



           Birth      00:00:00   00:00:00                         [facility]









                Smoking Status  Start Date      Stop Date       Source

 

                Never smoked tobacco                                 [facility]







Medications







       Ordered Filled Start  Stop   Current Ordering Indication Dosage Frequency

 Signature

                    Comments            Components          Source



     Medication Medication Date Date Medication? Clinician                (SIG) 

          



     Name Name                                                   

 

     SERTraline            Yes       [id number] 50mg      Take 1           

Univers



     50 mg      1-30                               tablet by           ity of



     tablet      00:00:                               mouth in           [place]



               00                                 the            Medical



                                                  morning.           Branch



                                                  MUST BE           



                                                  SEEN FOR           



                                                  FURTHER           



                                                  REFILLS           

 

     SERTraline            Yes       [id number] 50mg      Take 1           

Univers



     50 mg      1-30                               tablet by           ity of



     tablet      00:00:                               mouth in           [place]



               00                                 the            Medical



                                                  morning.           Branch



                                                  MUST BE           



                                                  SEEN FOR           



                                                  FURTHER           



                                                  REFILLS           

 

     cephALEXin      2023- Yes       [id number] 500mg      Take 1          

 Univers



     500 mg                                capsule by           ity of



     capsule      00:00: 05:59                          mouth 4           [place]



               00   :00                           (four)           Medical



                                                  times           [place]



                                                  daily for           



                                                  7 days.           

 

     cephALEXin      2023- Yes       [id number] 500mg      Take 1          

 Univers



     500 mg                                capsule by           ity of



     capsule      00:00: 05:59                          mouth 4           [place]



               00   :00                           (four)           Medical



                                                  times           [place]



                                                  daily for           



                                                  7 days.           

 

     cephALEXin      2023- Yes       [id number] 500mg      Take 1          

 Univers



     500 mg                                capsule by           ity of



     capsule      00:00: 05:59                          mouth 4           [place]



               00   :00                           (four)           Medical



                                                  times           Branch



                                                  daily for           



                                                  7 days.           

 

     SERTraline      3-0      Yes       [id number] 50mg      Take 1           

Univers



     50 mg      1-03                               tablet by           ity of



     tablet      00:00:                               mouth in           [place]



               00                                 the            Medical



                                                  morning.           Branch

 

     SERTraline      3-0      Yes       [id number] 50mg      Take 1           

Univers



     50 mg      1-03                               tablet by           ity of



     tablet      00:00:                               mouth in           [place]



               00                                 the            Medical



                                                  morning.           Branch

 

     SERTraline      -0      Yes       [id number] 50mg      Take 1           

Univers



     50 mg      1-03                               tablet by           ity of



     tablet      00:00:                               mouth in           [place]



               00                                 the            Medical



                                                  morning.           Branch

 

     SERTraline      -0      Yes       [id number] 50mg      Take 1           

Univers



     50 mg      1-03                               tablet by           ity of



     tablet      00:00:                               mouth in           [place]



               00                                 the            Medical



                                                  morning.           Branch

 

     SERTraline      -0      Yes       [id number] 50mg      Take 1           

Univers



     50 mg      1-03                               tablet by           ity of



     tablet      00:00:                               mouth in           [place]



               00                                 the            Medical



                                                  morning.           Branch

 

     SERTraline      -0      Yes       [id number] 50mg      Take 1           

Univers



     50 mg      1-03                               tablet by           ity of



     tablet      00:00:                               mouth in           [place]



               00                                 the            Medical



                                                  morning.           Branch

 

     SERTraline      -0      Yes       [id number] 50mg      Take 1           

Univers



     50 mg      1-03                               tablet by           ity of



     tablet      00:00:                               mouth in           [place]



               00                                 the            Medical



                                                  morning.           Branch

 

     SERTraline      -0      Yes       [id number] 50mg      Take 1           

Univers



     50 mg      1-03                               tablet by           ity of



     tablet      00:00:                               mouth in           [place]



               00                                 the            Medical



                                                  morning.           Branch

 

     SERTraline      -0      Yes       [id number] 50mg      Take 1           

Univers



     50 mg      1-03                               tablet by           ity of



     tablet      00:00:                               mouth in           [place]



               00                                 the            Medical



                                                  morning.           Branch

 

     SERTraline      3-0 2023- No        [id number] 50mg      Take 1          

 Univers



     50 mg      1-03 -                          tablet by           ity of



     tablet      00:00: 00:00                          mouth in           [place]



               00   :00                           the            Medical



                                                  morning.           Branch

 

     PNV       2022      Yes       [id number]           Take 1           Univer

s



     102-iron-fo      1-17                               TAB-CAP/M2           it

y of



     late-dha      00:00:                               by mouth           [place]



     (VITAFOL FE      00                                 daily.           Medica

l



     PLUS) 90 mg                                                        Branch



     iron- 1                                                        



     mg-200 mg                                                        



     Cap                                                         

 

     PNV       2022      Yes       [id number]           Take 1           Univer

s



     102-iron-fo      1-17                               TAB-CAP/M2           it

y of



     late-dha      00:00:                               by mouth           [place]



     (VITAFOL FE      00                                 daily.           Medica

l



     PLUS) 90 mg                                                        Branch



     iron- 1                                                        



     mg-200 mg                                                        



     Cap                                                         

 

     PNV       2022      Yes       [id number]           Take 1           Univer

s



     102-iron-fo      1-17                               TAB-CAP/M2           it

y of



     late-dha      00:00:                               by mouth           [place]



     (VITAFOL FE      00                                 daily.           Medica

l



     PLUS) 90 mg                                                        Branch



     iron- 1                                                        



     mg-200 mg                                                        



     Cap                                                         

 

     PNV       2022      Yes       [id number]           Take 1           Univer

s



     102-iron-fo      1-17                               TAB-CAP/M2           it

y of



     late-dha      00:00:                               by mouth           [place]



     (VITAFOL FE      00                                 daily.           Medica

l



     PLUS) 90 mg                                                        Branch



     iron- 1                                                        



     mg-200 mg                                                        



     Cap                                                         

 

     PNV       2022      Yes       [id number]           Take 1           Univer

s



     102-iron-fo      1-17                               TAB-CAP/M2           it

y of



     late-dha      00:00:                               by mouth           [place]



     (VITAFOL FE      00                                 daily.           Medica

l



     PLUS) 90 mg                                                        Branch



     iron- 1                                                        



     mg-200 mg                                                        



     Cap                                                         

 

     PNV       2022      Yes       [id number]           Take 1           [facility]

[facility]



     102-iron-fo      1-17                               TAB-CAP/M2           [facility]

y of



     late-dha      00:00:                               by mouth           [place]



     (VITAFOL FE      00                                 daily.           [facility]

[facility]



     PLUS) 90 mg                                                        [facility]



     iron- 1                                                        



     mg-200 mg                                                        



     Cap                                                         

 

     PNV       2022      Yes       [id number]           Take 1           [facility]

[facility]



     102-iron-fo      1-17                               TAB-CAP/M2           [facility]

y of



     late-dha      00:00:                               by mouth           [place]



     (VITAFOL FE      00                                 daily.           [facility]

[facility]



     PLUS) 90 mg                                                        [facility]



     iron- 1                                                        



     mg-200 mg                                                        



     Cap                                                         

 

     PNV       2022      Yes       [id number]           Take 1           [facility]

[facility]



     102-iron-fo      1-17                               TAB-CAP/M2           [facility]

y of



     late-dha      00:00:                               by mouth           [place]



     (VITAFOL FE      00                                 daily.           [facility]

[facility]



     PLUS) 90 mg                                                        [facility]



     iron- 1                                                        



     mg-200 mg                                                        



     Cap                                                         

 

     PNV       2022      Yes       [id number]           Take 1           [facility]

[facility]



     102-iron-fo      1-17                               TAB-CAP/M2           [facility]

y of



     late-dha      00:00:                               by mouth           [place]



     (VITAFOL FE      00                                 daily.           [facility]

[facility]



     PLUS) 90 mg                                                        [facility]



     iron- 1                                                        



     mg-200 mg                                                        



     Cap                                                         

 

     PNV       2022      Yes       [id number]           Take 1           [facility]

[facility]



     102-iron-fo      1-17                               TAB-CAP/M2           [facility]

y of



     late-dha      00:00:                               by mouth           [place]



     (VITAFOL FE      00                                 daily.           [facility]

[facility]



     PLUS) 90 mg                                                        [facility]



     iron- 1                                                        



     mg-200 mg                                                        



     Cap                                                         

 

     PNV       2022      Yes       [id number]           Take 1           [facility]

[facility]



     102-iron-fo      1-17                               TAB-CAP/M2           [facility]

y of



     late-dha      00:00:                               by mouth           [place]



     (VITAFOL FE      00                                 daily.           [facility]

[facility]



     PLUS) 90 mg                                                        [facility]



     iron- 1                                                        



     mg-200 mg                                                        



     Cap                                                         

 

     PNV       2022      Yes       [id number]           Take 1           [facility]

[facility]



     102-iron-fo      1-17                               TAB-CAP/M2           [facility]

y of



     late-dha      00:00:                               by mouth           [place]



     (VITAFOL FE      00                                 daily.           [facility]

[facility]



     PLUS) 90 mg                                                        [facility]



     iron- 1                                                        



     mg-200 mg                                                        



     Cap                                                         

 

     PNV       2022      Yes       [id number]           Take 1           [facility]

[facility]



     102-iron-fo      1-17                               TAB-CAP/M2           [facility]

y of



     late-dha      00:00:                               by mouth           [place]



     (VITAFOL FE      00                                 daily.           [facility]

[facility]



     PLUS) 90 mg                                                        [facility]



     iron- 1                                                        



     mg-200 mg                                                        



     Cap                                                         

 

     PNV       2022      Yes       [id number]           Take 1           [facility]

[facility]



     102-iron-fo      1-17                               TAB-CAP/M2           it

y of



     late-dha      00:00:                               by mouth           [place]



     (VITAFOL FE      00                                 daily.           [facility]

[facility]



     PLUS) 90 mg                                                        [facility]



     iron- 1                                                        



     mg-200 mg                                                        



     Cap                                                         

 

     PNV       2022      Yes       [id number]           Take 1           [facility]

[facility]



     102-iron-fo      1-17                               TAB-CAP/M2           it

y of



     late-dha      00:00:                               by mouth           [place]



     (VITAFOL FE      00                                 daily.           [facility]

[facility]



     PLUS) 90 mg                                                        [facility]



     iron- 1                                                        



     mg-200 mg                                                        



     Cap                                                         

 

     PNV       2022      Yes       [id number]           Take 1           [facility]

[facility]



     102-iron-fo      1-17                               TAB-CAP/M2           it

y of



     late-dha      00:00:                               by mouth           [place]



     (VITAFOL FE      00                                 daily.           [facility]

[facility]



     PLUS) 90 mg                                                        [facility]



     iron- 1                                                        



     mg-200 mg                                                        



     Cap                                                         

 

     PNV       2022      Yes       [id number]           Take 1           [facility]

[facility]



     102-iron-fo      1-17                               TAB-CAP/M2           it

y of



     late-dha      00:00:                               by mouth           [place]



     (VITAFOL FE      00                                 daily.           [facility]

[facility]



     PLUS) 90 mg                                                        [facility]



     iron- 1                                                        



     mg-200 mg                                                        



     Cap                                                         

 

     PNV       2022      Yes       [id number]           Take 1           [facility]

[facility]



     102-iron-fo      1-17                               TAB-CAP/M2           it

y of



     late-dha      00:00:                               by mouth           [place]



     (VITAFOL FE      00                                 daily.           [facility]

[facility]



     PLUS) 90 mg                                                        [facility]



     iron- 1                                                        



     mg-200 mg                                                        



     Cap                                                         

 

     SERTRALINE      2022      Yes       [id number] 50mg      TAKE 1           

[facility]



     50 mg      0-19                               TABLET BY           [facility]



     tablet      00:00:                               MOUTH IN           [place]



                                                THE            [facility]



                                                  MORNING           [facility]

 

     SERTRALINE      2022      Yes       [id number] 50mg      TAKE 1           

[facility]



     50 mg      0-19                               TABLET BY           [facility]



     tablet      00:00:                               MOUTH IN           [place]



                                                THE            [facility]



                                                  MORNING           [facility]

 

     SERTRALINE      2022      Yes       [id number] 50mg      TAKE 1           

[facility]



     50 mg      0-19                               TABLET BY           [facility]



     tablet      00:00:                               MOUTH IN           [place]



                                                THE            [facility]



                                                  MORNING           [facility]

 

     SERTRALINE      2022      Yes       [id number] 50mg      TAKE 1           

[facility]



     50 mg      0-19                               TABLET BY           [facility]



     tablet      00:00:                               MOUTH IN           [place]



                                                THE            [facility]



                                                  MORNING           [facility]

 

     SERTRALINE      2022      Yes       [id number] 50mg      TAKE 1           

[facility]



     50 mg      0-19                               TABLET BY           [facility]



     tablet      00:00:                               MOUTH IN           [place]



                                                THE            Medical



                                                  MORNING           [facility]

 

     SERTRALINE      2022      Yes       [id number] 50mg      TAKE 1           

[facility]



     50 mg      0-19                               TABLET BY           [facility]



     tablet      00:00:                               MOUTH IN           [place]



                                                THE            Medical



                                                  MORNING           [facility]

 

     SERTRALINE      2022      Yes       [id number] 50mg      TAKE 1           

[facility]



     50 mg      0-19                               TABLET BY           [facility]



     tablet      00:00:                               MOUTH IN           [place]



                                                THE            Medical



                                                  MORNING           [facility]

 

     SERTRALINE      2022      Yes       [id number] 50mg      TAKE 1           

[facility]



     50 mg      0-19                               TABLET BY           [facility]



     tablet      00:00:                               MOUTH IN           [place]



                                                THE            Medical



                                                  MORNING           [facility]

 

     SERTRALINE      2022      Yes       [id number] 50mg      TAKE 1           

[facility]



     50 mg      0-19                               TABLET BY           [facility]



     tablet      00:00:                               MOUTH IN           [place]



                                                THE            Medical



                                                  MORNING           [facility]

 

     SERTRALINE      2022- No        [id number] 50mg      TAKE 1          

 [facility]



     50 mg      0-19 -02                          TABLET BY           [facility]



     tablet      00:00: 00:00                          MOUTH IN           [place]



               00   :00                           THE            Medical



                                                  MORNING           [facility]

 

     dicyclomine      2022      Yes       [id number] 20mg      Take 1           [facility]

[facility]



     20 mg      0-16                               tablet by           [facility]



     tablet      00:00:                               mouth 4           [place]



               00                                 (four)           [facility]



                                                  times           [facility]



                                                  daily as           



                                                  needed for           



                                                  Abdominal           



                                                  pain.           

 

     ondansetron      2022      Yes       [id number] 4mg       Take 1           [facility]

[facility]



     4 mg      0-16                               tablet by           [facility]



     disintegrat      00:00:                               mouth           [place]



     ing tablet      00                                 every 8           [facility]

[facility]



                                                  (eight)           [facility]



                                                  hours as           



                                                  needed for           



                                                  Nausea and           



                                                  Vomiting           



                                                  (N/V).           

 

     dicyclomine      2022      Yes       [id number] 20mg      Take 1           [facility]

[facility]



     20 mg      0-16                               tablet by           [facility]



     tablet      00:00:                               mouth 4           [place]



               00                                 (four)           [facility]



                                                  times           [facility]



                                                  daily as           



                                                  needed for           



                                                  Abdominal           



                                                  pain.           

 

     ondansetron      2022      Yes       [id number] 4mg       Take 1           [facility]

[facility]



     4 mg      0-16                               tablet by           [facility]



     disintegrat      00:00:                               mouth           [place]



     ing tablet      00                                 every 8           [facility]

[facility]



                                                  (eight)           [facility]



                                                  hours as           



                                                  needed for           



                                                  Nausea and           



                                                  Vomiting           



                                                  (N/V).           

 

     dicyclomine      2022- No        [id number] 20mg      Take 1           

[facility]



     20 mg      0-16 11-17                          tablet by           [facility]



     tablet      00:00: 00:00                          mouth 4           [place]



               00   :00                           (four)           [facility]



                                                  times           [facility]



                                                  daily as           



                                                  needed for           



                                                  Abdominal           



                                                  pain.           

 

     ondansetron      2022- No        [id number] 4mg       Take 1           

[facility]



     4 mg      0-16 11-17                          tablet by           [facility]



     disintegrat      00:00: 00:00                          mouth           [place]

[facility]



     ing tablet      00   :00                           every 8           [facility]

[facility]



                                                  (eight)           [facility]



                                                  hours as           



                                                  needed for           



                                                  Nausea and           



                                                  Vomiting           



                                                  (N/V).           

 

     codeine-gua            Yes            10mL      Take 10 mL           

[facility]



     ifenesin      9-24                               by mouth           [facility]



      mg/5      00:00:                               every 6           [facility]

[facility]



     mL oral      00                                 (six)           [facility]



     solution                                         hours as           [facility]



                                                  needed for           



                                                  Cough.           



                                                  Indication           



                                                  s: cough           

 

     codeine-gua      -0      Yes            10mL      Take 10 mL           

[facility]



     ifenesin      9-24                               by mouth           [facility]



      mg/5      00:00:                               every 6           [facility]

[facility]



     mL oral      00                                 (six)           [facility]



     solution                                         hours as           [facility]



                                                  needed for           



                                                  Cough.           



                                                  Indication           



                                                  s: cough           

 

     codeine-gua      -0      Yes            10mL      Take 10 mL           

[facility]



     ifenesin      9-24                               by mouth           [facility]



      mg/5      00:00:                               every 6           [facility]

[facility]



     mL oral      00                                 (six)           [facility]



     solution                                         hours as           [facility]



                                                  needed for           



                                                  Cough.           



                                                  Indication           



                                                  s: cough           

 

     codeine-gua      -0      Yes            10mL      Take 10 mL           

[facility]



     ifenesin      9-24                               by mouth           [facility]



      mg/5      00:00:                               every 6           [facility]

[facility]



     mL oral      00                                 (six)           [facility]



     solution                                         hours as           [facility]



                                                  needed for           



                                                  Cough.           



                                                  Indication           



                                                  s: cough           

 

     codeine-gua      -0 - No             10mL      Take 10 mL          

 [facility]



     ifenesin      9-24 11-17                          by mouth           [facility]



      mg/5      00:00: 00:00                          every 6           [facility]



     mL oral      00   :00                           (six)           [facility]



     solution                                         hours as           [facility]



                                                  needed for           



                                                  Cough.           



                                                  Indication           



                                                  s: cough           

 

     codeine-gua      -0 - No             10mL      Take 10 mL          

 [facility]



     ifenesin      9-24 09-24                          by mouth           [facility]



      mg/5      00:00: 00:00                          every 6           [facility]



     mL oral      00   :00                           (six)           [facility]



     solution                                         hours as           [facility]



                                                  needed for           



                                                  Cough for           



                                                  up to 7           



                                                  days.           



                                                  Indication           



                                                  s: cough           

 

     BUSPIRONE      -0      Yes       [id number]           TAKE 1           [facility]

[facility]



     10 mg      9-16                               TABLET BY           [facility]



     tablet      00:00:                               MOUTH           [place]



               00                                 TWICE           [facility]



                                                  DAILY AS           [facility]



                                                  NEEDED FOR           



                                                  ANXIETY           

 

     BUSPIRONE      -0      Yes       [id number]           TAKE 1           [facility]

[facility]



     10 mg      9-16                               TABLET BY           [facility]



     tablet      00:00:                               MOUTH           [place]



               00                                 TWICE           [facility]



                                                  DAILY AS           [facility]



                                                  NEEDED FOR           



                                                  ANXIETY           

 

     BUSPIRONE      -0      Yes       [id number]           TAKE 1           [facility]

[facility]



     10 mg      9-16                               TABLET BY           [facility]



     tablet      00:00:                               MOUTH           [place]



               00                                 TWICE           [facility]



                                                  DAILY AS           [facility]



                                                  NEEDED FOR           



                                                  ANXIETY           

 

     BUSPIRONE      -0      Yes       [id number]           TAKE 1           [facility]

[facility]



     10 mg      9-16                               TABLET BY           [facility]



     tablet      00:00:                               MOUTH           [place]



               00                                 TWICE           [facility]



                                                  DAILY AS           [facility]



                                                  NEEDED FOR           



                                                  ANXIETY           

 

     BUSPIRONE      -0      Yes       [id number]           TAKE 1           [facility]

[facility]



     10 mg      9-16                               TABLET BY           [facility]



     tablet      00:00:                               MOUTH           [place]



               00                                 TWICE           [facility]



                                                  DAILY AS           [facility]



                                                  NEEDED FOR           



                                                  ANXIETY           

 

     BUSPIRONE      -0      Yes       [id number]           TAKE 1           [facility]

[facility]



     10 mg      9-16                               TABLET BY           [facility]



     tablet      00:00:                               MOUTH           [place]



               00                                 TWICE           Medical



                                                  DAILY AS           [facility]



                                                  NEEDED FOR           



                                                  ANXIETY           

 

     BUSPIRONE      -0      Yes       [id number]           TAKE 1           [facility]

[facility]



     10 mg      9-16                               TABLET BY           [facility]



     tablet      00:00:                               MOUTH           [place]



               00                                 TWICE           Medical



                                                  DAILY AS           [facility]



                                                  NEEDED FOR           



                                                  ANXIETY           

 

     BUSPIRONE      -0      Yes       [id number]           TAKE 1           [facility]

[facility]



     10 mg      9-16                               TABLET BY           [facility]



     tablet      00:00:                               MOUTH           [place]



               00                                 TWICE           Medical



                                                  DAILY AS           [facility]



                                                  NEEDED FOR           



                                                  ANXIETY           

 

     BUSPIRONE      -0      Yes       [id number]           TAKE 1           [facility]

nivers



     10 mg      9-16                               TABLET BY           [facility]



     tablet      00:00:                               MOUTH           [place]



               00                                 TWICE           Medical



                                                  DAILY AS           [facility]



                                                  NEEDED FOR           



                                                  ANXIETY           

 

     BUSPIRONE      -0      Yes       [id number]           TAKE 1           [facility]

nivers



     10 mg      9-16                               TABLET BY           [facility]



     tablet      00:00:                               MOUTH           [place]



               00                                 TWICE           Medical



                                                  DAILY AS           [facility]



                                                  NEEDED FOR           



                                                  ANXIETY           

 

     BUSPIRONE      -0      Yes       [id number]           TAKE 1           [facility]

[facility]



     10 mg      9-16                               TABLET BY           [facility]



     tablet      00:00:                               MOUTH           [place]



               00                                 TWICE           Medical



                                                  DAILY AS           [facility]



                                                  NEEDED FOR           



                                                  ANXIETY           

 

     BUSPIRONE      -0      Yes       [id number]           TAKE 1           [facility]

nivers



     10 mg      9-16                               TABLET BY           [facility]



     tablet      00:00:                               MOUTH           [place]



               00                                 TWICE           Medical



                                                  DAILY AS           [facility]



                                                  NEEDED FOR           



                                                  ANXIETY           

 

     BUSPIRONE      -0      Yes       [id number]           TAKE 1           [facility]

nivers



     10 mg      9-16                               TABLET BY           [facility] of



     tablet      00:00:                               MOUTH           [place]



               00                                 TWICE           Medical



                                                  DAILY AS           [facility]



                                                  NEEDED FOR           



                                                  ANXIETY           

 

     BUSPIRONE      -0      Yes       [id number]           TAKE 1           [facility]

nivers



     10 mg      9-16                               TABLET BY           [facility]



     tablet      00:00:                               MOUTH           [place]



               00                                 TWICE           Medical



                                                  DAILY AS           [facility]



                                                  NEEDED FOR           



                                                  ANXIETY           

 

     BUSPIRONE      -0      Yes       [id number]           TAKE 1           [facility]

nivers



     10 mg      9-16                               TABLET BY           [facility]



     tablet      00:00:                               MOUTH           [place]



               00                                 TWICE           Medical



                                                  DAILY AS           [facility]



                                                  NEEDED FOR           



                                                  ANXIETY           

 

     BUSPIRONE      -0      Yes       [id number]           TAKE 1           [facility]

nivers



     10 mg      9-16                               TABLET BY           [facility] of



     tablet      00:00:                               MOUTH           [place]



               00                                 TWICE           Medical



                                                  DAILY AS           [facility]



                                                  NEEDED FOR           



                                                  ANXIETY           

 

     BUSPIRONE      -0      Yes       [id number]           TAKE 1           [facility]

nivers



     10 mg      9-16                               TABLET BY           [facility]



     tablet      00:00:                               MOUTH           [place]



               00                                 TWICE           Medical



                                                  DAILY AS           [facility]



                                                  NEEDED FOR           



                                                  ANXIETY           

 

     BUSPIRONE      -0      Yes       [id number]           TAKE 1           [facility]

nivers



     10 mg      9-16                               TABLET BY           [facility]



     tablet      00:00:                               MOUTH           [place]



               00                                 TWICE           Medical



                                                  DAILY AS           [facility]



                                                  NEEDED FOR           



                                                  ANXIETY           

 

     BUSPIRONE      -0      Yes       [id number]           TAKE 1           [facility]

nivers



     10 mg      9-16                               TABLET BY           [facility] of



     tablet      00:00:                               MOUTH           [place]



               00                                 TWICE           Medical



                                                  DAILY AS           [facility]



                                                  NEEDED FOR           



                                                  ANXIETY           

 

     BUSPIRONE      -0      Yes       [id number]           TAKE 1           [facility]

nivers



     10 mg      9-16                               TABLET BY           [facility]



     tablet      00:00:                               MOUTH           [place]



               00                                 TWICE           Medical



                                                  DAILY AS           [facility]



                                                  NEEDED FOR           



                                                  ANXIETY           

 

     BUSPIRONE      -0      Yes       [id number]           TAKE 1           [facility]

nivers



     10 mg      9-16                               TABLET BY           [facility]



     tablet      00:00:                               MOUTH           [place]



               00                                 TWICE           Medical



                                                  DAILY AS           [facility]



                                                  NEEDED FOR           



                                                  ANXIETY           

 

     BUSPIRONE      -0      Yes       [id number]           TAKE 1           [facility]

nivers



     10 mg      9-16                               TABLET BY           [facility]



     tablet      00:00:                               MOUTH           [place]



               00                                 TWICE           Medical



                                                  DAILY AS           [facility]



                                                  NEEDED FOR           



                                                  ANXIETY           

 

     BUSPIRONE      -0      Yes       [id number]           TAKE 1           [facility]

nivers



     10 mg      9-16                               TABLET BY           [facility]



     tablet      00:00:                               MOUTH           [place]



               00                                 TWICE           Medical



                                                  DAILY AS           [facility]



                                                  NEEDED FOR           



                                                  ANXIETY           

 

     BUSPIRONE      -0      Yes       [id number]           TAKE 1           [facility]

nivers



     10 mg      9-16                               TABLET BY           [facility]



     tablet      00:00:                               MOUTH           [place]



               00                                 TWICE           Medical



                                                  DAILY AS           [facility]



                                                  NEEDED FOR           



                                                  ANXIETY           

 

     BUSPIRONE      -0      Yes       [id number]           TAKE 1           [facility]

nivers



     10 mg      9-16                               TABLET BY           [facility] of



     tablet      00:00:                               MOUTH           [place]



               00                                 TWICE           Medical



                                                  DAILY AS           [facility]



                                                  NEEDED FOR           



                                                  ANXIETY           

 

     SERTraline      -0      Yes       [id number] 50mg      Take 1           

[facility]



     (ZOLOFT) 50      8-16                               tablet by           [facility]

 of



     mg tablet      00:00:                               mouth in           [place]

s



               00                                 the            Medical



                                                  morning.           [facility]

 

     SERTraline      -0      Yes       [id number] 50mg      Take 1           

[facility]



     (ZOLOFT) 50      8-16                               tablet by           [facility]

 of



     mg tablet      00:00:                               mouth in           [place]

s



               00                                 the            Medical



                                                  morning.           [facility]

 

     SERTraline      -0      Yes       [id number] 50mg      Take 1           

[facility]



     (ZOLOFT) 50      8-16                               tablet by           [facility]

 of



     mg tablet      00:00:                               mouth in           [place]

s



               00                                 the            Medical



                                                  morning.           [facility]

 

     SERTraline            Yes       [id number] 50mg      Take 1           

[facility]



     (ZOLOFT) 50      8-16                               tablet by           [facility]

 of



     mg tablet      00:00:                               mouth in           [place]

s



               00                                 the            Medical



                                                  morning.           [facility]

 

     SERTraline            Yes       [id number] 50mg      Take 1           

[facility]



     (ZOLOFT) 50      8-16                               tablet by           [facility]

 of



     mg tablet      00:00:                               mouth in           [place]

s



               00                                 the            Medical



                                                  morning.           [facility]

 

     SERTraline            Yes       [id number] 50mg      Take 1           

[facility]



     (ZOLOFT) 50      8-16                               tablet by           [facility]

 of



     mg tablet      00:00:                               mouth in           [place]

s



               00                                 the            Medical



                                                  morning.           [facility]

 

     SERTraline            Yes       [id number] 50mg      Take 1           

[facility]



     (ZOLOFT) 50      8-16                               tablet by           [facility]

 of



     mg tablet      00:00:                               mouth in           [place]

s



               00                                 the            Medical



                                                  morning.           [facility]

 

     SERTraline      2022- No        [id number] 50mg      Take 1          

 [facility]



     (ZOLOFT) 50      8-16 10-19                          tablet by           it

y of



     mg tablet      00:00: 00:00                          mouth in           [facility]

as



               00   :00                           the            Medical



                                                  morning.           [facility]

 

     busPIRone      2022- No        [id number] 10mg      Take 1           

[facility]



     10 mg      8-16 09-16                          tablet by           [facility]



     tablet      00:00: 04:59                          mouth 2           [place]



               00   :00                           (two)           Medical



                                                  times           [facility]



                                                  daily as           



                                                  needed           



                                                  (anxiety)           



                                                  for up to           



                                                  30 days.           

 

     busPIRone      2022- No        [id number] 10mg      Take 1           

[facility]



     10 mg      8-16 09-16                          tablet by           [facility]



     tablet      00:00: 04:59                          mouth 2           [place]



               00   :00                           (two)           Medical



                                                  times           [facility]



                                                  daily as           



                                                  needed           



                                                  (anxiety)           



                                                  for up to           



                                                  30 days.           

 

     miSOPROStoL            Yes       [id number]           Take one        

   [facility]



     200 mcg      3-31                               tablet           [facility]



     tablet      00:00:                               night           [place]



               00                                 before           Medical



                                                  procedure,           [facility]



                                                  then take           



                                                  one tablet           



                                                  morning of           



                                                  procedure           

 

     miSOPROStoL      0      Yes       [id number]           Take one        

   [facility]



     200 mcg      3-31                               tablet           [facility] of



     tablet      00:00:                               night           [place]



               00                                 before           Medical



                                                  procedure,           [facility]



                                                  then take           



                                                  one tablet           



                                                  morning of           



                                                  procedure           

 

     miSOPROStoL      2022-0      Yes       [id number]           Take one        

   [facility]



     200 mcg      3-31                               tablet           [facility]



     tablet      00:00:                               night           [place]



               00                                 before           Medical



                                                  procedure,           [facility]



                                                  then take           



                                                  one tablet           



                                                  morning of           



                                                  procedure           

 

     miSOPROStoL      0      Yes       [id number]           Take one        

   [facility]



     200 mcg      3-31                               tablet           [facility] of



     tablet      00:00:                               night           [place]



               00                                 before           Medical



                                                  procedure,           [facility]



                                                  then take           



                                                  one tablet           



                                                  morning of           



                                                  procedure           

 

     miSOPROStoL      0      Yes       [id number]           Take one        

   [facility]



     200 mcg      3-31                               tablet           [facility] of



     tablet      00:00:                               night           [place]



               00                                 before           Medical



                                                  procedure,           [facility]



                                                  then take           



                                                  one tablet           



                                                  morning of           



                                                  procedure           

 

     miSOPROStoL            Yes       [id number]           Take one        

   [facility]



     200 mcg      3-31                               tablet           [facility] of



     tablet      00:00:                               night           [place]



               00                                 before           Medical



                                                  procedure,           [facility]



                                                  then take           



                                                  one tablet           



                                                  morning of           



                                                  procedure           

 

     miSOPROStoL      0      Yes       [id number]           Take one        

   [facility]



     200 mcg      3-31                               tablet           [facility] of



     tablet      00:00:                               night           [place]



               00                                 before           Medical



                                                  procedure,           [facility]



                                                  then take           



                                                  one tablet           



                                                  morning of           



                                                  procedure           

 

     miSOPROStoL      0      Yes       [id number]           Take one        

   [facility]



     200 mcg      3-31                               tablet           [facility] of



     tablet      00:00:                               night           [place]



               00                                 before           Medical



                                                  procedure,           [facility]



                                                  then take           



                                                  one tablet           



                                                  morning of           



                                                  procedure           

 

     miSOPROStoL      2022- No        [id number]           Take one       

    [facility]



     200 mcg      3-31 11-17                          tablet           ity of



     tablet      00:00: 00:00                          night           [place]



               00   :00                           before           Medical



                                                  procedure,           [facility]



                                                  then take           



                                                  one tablet           



                                                  morning of           



                                                  procedure           

 

     naproxen      -0      Yes       [id number] 500mg      Take 1           [facility]

nivers



     (NAPROSYN)      3-10                               tablet by           [facility] 

of



     500 mg      00:00:                               mouth 2           [place]



     tablet      00                                 (two)           Medical



                                                  times           [facility]



                                                  daily.           

 

     methocarbam      -0      Yes       [id number] 500mg      Take 1         

  [facility]



     oL 500 mg      3-10                               tablet by           [facility] o

f



     tablet      00:00:                               mouth 2           [place]



               00                                 (two)           Medical



                                                  times           [facility]



                                                  daily.           

 

     naproxen      2022-0      Yes       [id number] 500mg      Take 1           [facility]

nivers



     (NAPROSYN)      3-10                               tablet by           [facility] 

of



     500 mg      00:00:                               mouth 2           [place]



     tablet      00                                 (two)           Medical



                                                  times           [facility]



                                                  daily.           

 

     methocarbam      2022-0      Yes       [id number] 500mg      Take 1         

  [facility]



     oL 500 mg      3-10                               tablet by           [facility] o

f



     tablet      00:00:                               mouth 2           [place]



               00                                 (two)           Medical



                                                  times           [facility]



                                                  daily.           

 

     naproxen      2022-0      Yes       [id number] 500mg      Take 1           [facility]

nivers



     (NAPROSYN)      3-10                               tablet by           [facility] 

of



     500 mg      00:00:                               mouth 2           [place]



     tablet      00                                 (two)           Medical



                                                  times           [facility]



                                                  daily.           

 

     methocarbam      2022-0      Yes       [id number] 500mg      Take 1         

  [facility]



     oL 500 mg      3-10                               tablet by           [facility] o

f



     tablet      00:00:                               mouth 2           [place]



               00                                 (two)           [facility]



                                                  times           [facility]



                                                  daily.           

 

     naproxen      2022-0      Yes       [id number] 500mg      Take 1           [facility]

n[facility]



     (NAPROSYN)      3-10                               tablet by           [facility] 

of



     500 mg      00:00:                               mouth 2           [place]



     tablet      00                                 (two)           Medical



                                                  times           [facility]



                                                  daily.           

 

     methocarbam      2022-0      Yes       [id number] 500mg      Take 1         

  [facility]



     oL 500 mg      3-10                               tablet by           ity o

f



     tablet      00:00:                               mouth 2           [place]



               00                                 (two)           [facility]



                                                  times           [facility]



                                                  daily.           

 

     naproxen      2022-0      Yes       [id number] 500mg      Take 1           [facility]

n[facility]



     (NAPROSYN)      3-10                               tablet by           ity 

of



     500 mg      00:00:                               mouth 2           [place]



     tablet      00                                 (two)           Medical



                                                  times           [facility]



                                                  daily.           

 

     naproxen      2022-0      Yes       [id number] 500mg      Take 1           [facility]

n[facility]



     (NAPROSYN)      3-10                               tablet by           ity 

of



     500 mg      00:00:                               mouth 2           [place]



     tablet      00                                 (two)           Medical



                                                  times           [facility]



                                                  daily.           

 

     naproxen      2022-0      Yes       [id number] 500mg      Take 1           [facility]

nivers



     (NAPROSYN)      3-10                               tablet by           ity 

of



     500 mg      00:00:                               mouth 2           [place]



     tablet      00                                 (two)           Medical



                                                  times           [facility]



                                                  daily.           

 

     naproxen      2-0      Yes       [id number] 500mg      Take 1           [facility]

nivers



     (NAPROSYN)      3-10                               tablet by           ity 

of



     500 mg      00:00:                               mouth 2           [place]



     tablet      00                                 (two)           Medical



                                                  times           [facility]



                                                  daily.           

 

     naproxen      2022-0 2022- No        [id number] 500mg      Take 1           

[facility]



     (NAPROSYN)      3-10 11-17                          tablet by           ity

 of



     500 mg      00:00: 00:00                          mouth 2           [place]



     tablet      00   :00                           (two)           Medical



                                                  times           [facility]



                                                  daily.           

 

     methocarbam      2022-0 2022- No        [id number] 500mg      Take 1        

   [facility]



     oL 500 mg      3-10 09-24                          tablet by           [facility] 

of



     tablet      00:00: 00:00                          mouth 2           [place]



               00   :00                           (two)           Medical



                                                  times           [facility]



                                                  daily.           







Immunizations







           Ordered    Filled Immunization Date       Status     Comments   [facility]

e



           Immunization Name Name                                        

 

           Influenza Virus            2022 Completed             [facility]

[facility]



           Vaccine Quad IM,            00:00:00                         [place] [facility]

[facility]



           Preserv and ABX                                             Branch



           Free 6 MO-64 YRS                                             

 

           Influenza Virus            2022 Completed             [facility]

[facility] of



           Vaccine Quad IM,            00:00:00                         [place] [facility]

[facility]



           Preserv and ABX                                             Branch



           Free 6 MO-64 YRS                                             

 

           Influenza Virus            2022 Completed             [facility]

[facility] of



           Vaccine Quad IM,            00:00:00                         [place] [facility]

[facility]



           Preserv and ABX                                             [facility]



           Free 6 MO-64 YRS                                             

 

           Influenza Virus            2022 Completed             [facility]

[facility] of



           Vaccine Quad IM,            00:00:00                         [place] [facility]

[facility]



           Preserv and ABX                                             [facility]anch



           Free 6 MO-64 YRS                                             

 

           Influenza Virus            2022 Completed             [facility]

[facility] of



           Vaccine Quad IM,            00:00:00                         [place] [facility]

[facility]



           Preserv and ABX                                             [facility]anch



           Free 6 MO-64 YRS                                             

 

           Influenza Virus            2022 Completed             [facility]

[facility] of



           Vaccine Quad IM,            00:00:00                         [place] [facility]

[facility]



           Preserv and ABX                                             [facility]anch



           Free 6 MO-64 YRS                                             

 

           Influenza Virus            2022 Completed             [facility]

[facility] of



           Vaccine Quad IM,            00:00:00                         [place] [facility]

[facility]



           Preserv and ABX                                             Branch



           Free 6 MO-64 YRS                                             

 

           Influenza Virus            2022 Completed             [facility]

[facility] of



           Vaccine Quad IM,            00:00:00                         [place] [facility]

[facility]



           Preserv and ABX                                             [facility]anch



           Free 6 MO-64 YRS                                             

 

           Influenza Virus            2022 Completed             [facility]

[facility] of



           Vaccine Quad IM,            00:00:00                         [place] [facility]

[facility]



           Preserv and ABX                                             Branch



           Free 6 MO-64 YRS                                             

 

           Influenza Virus            2022 Completed             [facility]

[facility] of



           Vaccine Quad IM,            00:00:00                         [place] [facility]

[facility]



           Preserv and ABX                                             Branch



           Free 6 MO-64 YRS                                             

 

           Influenza Virus            2022 Completed             [facility]

[facility] of



           Vaccine Quad IM,            00:00:00                         [place] [facility]

[facility]



           Preserv and ABX                                             Branch



           Free 6 MO-64 YRS                                             

 

           Influenza Virus            2022 Completed             [facility]

[facility] of



           Vaccine Quad IM,            00:00:00                         [place] [facility]

[facility]



           Preserv and ABX                                             Branch



           Free 6 MO-64 YRS                                             

 

           Influenza Virus            2022 Completed             [facility]

[facility] of



           Vaccine Quad IM,            00:00:00                         [place] [facility]

[facility]



           Preserv and ABX                                             Branch



           Free 6 MO-64 YRS                                             

 

           Influenza Virus            2022 Completed             [facility]

[facility] of



           Vaccine Quad IM,            00:00:00                         [place] [facility]

[facility]



           Preserv and ABX                                             Branch



           Free 6 MO-64 YRS                                             

 

           Influenza Virus            2022 Completed             [facility]

[facility] of



           Vaccine Quad IM,            00:00:00                         [place] [facility]

[facility]



           Preserv and ABX                                             Branch



           Free 6 MO-64 YRS                                             

 

           Influenza Virus            2022 Completed             [facility]

[facility] of



           Vaccine Quad IM,            00:00:00                         [place] [facility]

[facility]



           Preserv and ABX                                             Branch



           Free 6 MO-64 YRS                                             

 

           Influenza Virus            2022 Completed             [facility]

[facility] of



           Vaccine Quad IM,            00:00:00                         [place] [facility]

[facility]



           Preserv and ABX                                             Branch



           Free 6 MO-64 YRS                                             

 

           Influenza Virus            2022 Completed             [facility]

[facility] of



           Vaccine Quad IM,            00:00:00                         [place] [facility]

[facility]



           Preserv and ABX                                             Branch



           Free 6 MO-64 YRS                                             

 

           Influenza Virus            2022 Completed             [facility]

y of



           Vaccine Quad IM,            00:00:00                         [place] [facility]

[facility]



           Preserv and ABX                                             Branch



           Free 6 MO-64 YRS                                             

 

           HPV9                  2022 Completed             [facility]



                                 00:00:00                         [facility]

 

           HPV9                  2022 Completed             [facility]



                                 00:00:00                         [facility]

 

           HPV9                  2022 Completed             [facility]



                                 00:00:00                         [facility]

 

           HPV9                  2022 Completed             [facility]



                                 00:00:00                         [facility]

 

           HPV9                  2022 Completed             [facility]



                                 00:00:00                         [facility]

 

           HPV9                  2022 Completed             [facility]



                                 00:00:00                         [facility]

 

           HPV9                  2022 Completed             [facility]



                                 00:00:00                         [facility]

 

           HPV9                  2022 Completed             [facility]



                                 00:00:00                         [facility]

 

           HPV9                  2022 Completed             [facility]



                                 00:00:00                         [facility]

 

           HPV9                  2022 Completed             [facility]



                                 00:00:00                         [facility]

 

           HPV9                  2022 Completed             [facility]



                                 00:00:00                         [facility]

 

           HPV9                  2022 Completed             [facility]



                                 00:00:00                         [facility]

 

           HPV9                  2022 Completed             [facility]



                                 00:00:00                         [facility]



                                                                  Branch

 

           HPV9                  2022 Completed             [facility]



                                 00:00:00                         [facility]



                                                                  Branch

 

           HPV9                  2022 Completed             [facility]



                                 00:00:00                         [place] [facility]



                                                                  [facility]

 

           HPV9                  2022 Completed             [facility]



                                 00:00:00                         [place] [facility]



                                                                  [facility]

 

           HPV9                  2022 Completed             [facility]



                                 00:00:00                         [place] [facility]



                                                                  [facility]

 

           HPV9                  2022 Completed             [facility]



                                 00:00:00                         [place] [facility]



                                                                  [facility]

 

           HPV9                  2022 Completed             [facility]



                                 00:00:00                         [place] [facility]



                                                                  [facility]

 

           HPV9                  2022 Completed             [facility]



                                 00:00:00                         [place] [facility]



                                                                  [facility]

 

           HPV9                  2022 Completed             [facility]



                                 00:00:00                         [facility]



                                                                  Branch

 

           HPV9                  2022 Completed             [facility]



                                 00:00:00                         [place] [facility]



                                                                  [facility]

 

           HPV9                  2022 Completed             [facility]



                                 00:00:00                         [facility]



                                                                  Branch

 

           HPV9                  2022 Completed             [facility]



                                 00:00:00                         [facility]



                                                                  Branch

 

           HPV9                  2022 Completed             [facility]



                                 00:00:00                         [facility]



                                                                  Branch

 

           HPV9                  2022 Completed             [facility]



                                 00:00:00                         [facility]



                                                                  Branch

 

           HPV9                  2022 Completed             [facility]



                                 00:00:00                         [facility]



                                                                  Branch

 

           TDAP                  2020 Completed             [facility]



                                 00:00:00                         [place] [facility]



                                                                  [facility]

 

           TDAP                  2020 Completed             [facility]



                                 00:00:00                         [place] [facility]



                                                                  [facility]

 

           TDAP                  2020 Completed             [facility]



                                 00:00:00                         [place] [facility]



                                                                  [facility]

 

           TDAP                  2020 Completed             [facility]



                                 00:00:00                         [place] [facility]



                                                                  [facility]

 

           TDAP                  2020 Completed             [facility]



                                 00:00:00                         [place] [facility]



                                                                  [facility]

 

           TDAP                  2020 Completed             [facility]



                                 00:00:00                         [place] [facility]



                                                                  [facility]

 

           TDAP                  2020 Completed             [facility]



                                 00:00:00                         [place] [facility]



                                                                  [facility]

 

           TDAP                  2020 Completed             [facility]



                                 00:00:00                         [place] [facility]



                                                                  [facility]

 

           TDAP                  2020 Completed             [facility]



                                 00:00:00                         [place] [facility]



                                                                  [facility]

 

           TDAP                  2020 Completed             [facility]



                                 00:00:00                         [place] [facility]



                                                                  [facility]

 

           TDAP                  2020 Completed             [facility]



                                 00:00:00                         [place] [facility]



                                                                  [facility]

 

           TDAP                  2020 Completed             [facility]



                                 00:00:00                         [place] [facility]



                                                                  [facility]

 

           TDAP                  2020 Completed             [facility]



                                 00:00:00                         [place] [facility]



                                                                  [facility]

 

           TDAP                  2020 Completed             [facility]



                                 00:00:00                         [place] [facility]



                                                                  [facility]

 

           TDAP                  2020 Completed             [facility]



                                 00:00:00                         [place] [facility]



                                                                  [facility]

 

           TDAP                  2020 Completed             [facility]



                                 00:00:00                         [place] [facility]



                                                                  [facility]

 

           TDAP                  2020 Completed             [facility]



                                 00:00:00                         [place] [facility]



                                                                  [facility]

 

           TDAP                  2020 Completed             [facility]



                                 00:00:00                         [place] [facility]



                                                                  [facility]

 

           TDAP                  2020 Completed             [facility]



                                 00:00:00                         [place] [facility]



                                                                  [facility]

 

           TDAP                  2020 Completed             [facility]



                                 00:00:00                         [place] [facility]



                                                                  [facility]

 

           TDAP                  2020 Completed             [facility]



                                 00:00:00                         [place] [facility]



                                                                  [facility]

 

           TDAP                  2020 Completed             [facility]



                                 00:00:00                         [place] [facility]



                                                                  [facility]

 

           TDAP                  2020 Completed             [facility]



                                 00:00:00                         [place] [facility]



                                                                  [facility]

 

           TDAP                  2020 Completed             [facility]



                                 00:00:00                         [place] [facility]



                                                                  [facility]

 

           TDAP                  2020 Completed             [facility]



                                 00:00:00                         [place] [facility]



                                                                  [facility]

 

           TDAP                  2020 Completed             [facility]



                                 00:00:00                         [place] [facility]



                                                                  [facility]anch

 

           TDAP                  2020 Completed             [facility]



                                 00:00:00                         [facility]

 

           Influenza Virus            2020-10-12 Completed             [facility]

y of



           Vaccine Quad .5 mL            00:00:00                         [place] 

[facility]



           IM 6+ MO                                               Branch

 

           Influenza Virus            2020-10-12 Completed             [facility]

y of



           Vaccine Quad .5 mL            00:00:00                         [place] 

[facility]



           IM 6+ MO                                               Branch

 

           Influenza Virus            2020-10-12 Completed             [facility]

[facility] of



           Vaccine Quad .5 mL            00:00:00                         [place] 

[facility]



           IM 6+ MO                                               Branch

 

           Influenza Virus            2020-10-12 Completed             [facility]

y of



           Vaccine Quad .5 mL            00:00:00                         [place] 

[facility]



           IM 6+ MO                                               Branch

 

           Influenza Virus            2020-10-12 Completed             Universit

y of



           Vaccine Quad .5 mL            00:00:00                         [place] 

[facility]



           IM 6+ MO                                               Branch

 

           Influenza Virus            2020-10-12 Completed             Universit

y of



           Vaccine Quad .5 mL            00:00:00                         [facility]



           IM 6+ MO                                               Branch

 

           Influenza Virus            2020-10-12 Completed             Universit

y of



           Vaccine Quad .5 mL            00:00:00                         [facility]



           IM 6+ MO                                               Branch

 

           Influenza Virus            2020-10-12 Completed             Universit

y of



           Vaccine Quad .5 mL            00:00:00                         [facility]



           IM 6+ MO                                               Branch

 

           Influenza Virus            2020-10-12 Completed             Universit

y of



           Vaccine Quad .5 mL            00:00:00                         [facility]



           IM 6+ MO                                               Branch

 

           Influenza Virus            2020-10-12 Completed             Universit

y of



           Vaccine Quad .5 mL            00:00:00                         [facility]



           IM 6+ MO                                               Branch

 

           Influenza Virus            2020-10-12 Completed             Universit

y of



           Vaccine Quad .5 mL            00:00:00                         [facility]



           IM 6+ MO                                               Branch

 

           Influenza Virus            2020-10-12 Completed             Universit

y of



           Vaccine Quad .5 mL            00:00:00                         [facility]



           IM 6+ MO                                               Branch

 

           Influenza Virus            2020-10-12 Completed             Universit

y of



           Vaccine Quad .5 mL            00:00:00                         [facility]



           IM 6+ MO                                               Branch

 

           Influenza Virus            2020-10-12 Completed             Universit

y of



           Vaccine Quad .5 mL            00:00:00                         [facility]



           IM 6+ MO                                               Branch

 

           Influenza Virus            2020-10-12 Completed             Universit

y of



           Vaccine Quad .5 mL            00:00:00                         [facility]



           IM 6+ MO                                               Branch

 

           Influenza Virus            2020-10-12 Completed             Universit

y of



           Vaccine Quad .5 mL            00:00:00                         [facility]



           IM 6+ MO                                               Branch

 

           Influenza Virus            2020-10-12 Completed             Universit

y of



           Vaccine Quad .5 mL            00:00:00                         [facility]



           IM 6+ MO                                               Branch

 

           Influenza Virus            2020-10-12 Completed             Universit

y of



           Vaccine Quad .5 mL            00:00:00                         [facility]



           IM 6+ MO                                               Branch

 

           Influenza Virus            2020-10-12 Completed             Universit

y of



           Vaccine Quad .5 mL            00:00:00                         [facility]



           IM 6+ MO                                               Branch

 

           Influenza Virus            2020-10-12 Completed             Universit

y of



           Vaccine Quad .5 mL            00:00:00                         [facility]



           IM 6+ MO                                               Branch

 

           Influenza Virus            2020-10-12 Completed             Universit

y of



           Vaccine Quad .5 mL            00:00:00                         [facility]



           IM 6+ MO                                               Branch

 

           Influenza Virus            2020-10-12 Completed             Universit

y of



           Vaccine Quad .5 mL            00:00:00                         [facility]



           IM 6+ MO                                               Branch

 

           Influenza Virus            2020-10-12 Completed             Universit

y of



           Vaccine Quad .5 mL            00:00:00                         [facility]



           IM 6+ MO                                               Branch

 

           Influenza Virus            2020-10-12 Completed             Universit

y of



           Vaccine Quad .5 mL            00:00:00                         [facility]



           IM 6+ MO                                               Branch

 

           Influenza Virus            2020-10-12 Completed             Universit

y of



           Vaccine Quad .5 mL            00:00:00                         [place] 

[facility]



           IM 6+ MO                                               Branch

 

           Influenza Virus            2020-10-12 Completed             Universit

y of



           Vaccine Quad .5 mL            00:00:00                         [facility]



           IM 6+ MO                                               Branch

 

           Influenza Virus            2020-10-12 Completed             Universit

y of



           Vaccine Quad .5 mL            00:00:00                         [facility]



           IM 6+ MO                                               Branch







Vital Signs







             Vital Name   Observation Time Observation Value Comments     Source

 

             Systolic blood 2023 20:01:00 85 mm[Hg]                 Univer

sity of



             pressure                                            [facility]

 

             Diastolic blood 2023 20:01:00 57 mm[Hg]                 Unive

rsity of



             pressure                                            [facility]

 

             Heart rate   2023 20:00:00 84 /min                   Universi

ty of



                                                                 [facility]

 

             Body temperature 2023 20:00:00 36.78 Daf[place]                 Univ

ersity of



                                                                 [facility]

 

             Body height  2023 20:00:00 160 cm                    Universi

ty of



                                                                 [facility]

 

             Body weight  2023 20:00:00 103.692 kg                Universi

ty of



                                                                 [facility]

 

             BMI          2023 20:00:00 40.49 kg/m2               Universi

ty of



                                                                 [facility]

 

             Systolic blood 2023 19:57:00 101 mm[Hg]                Univer

sity of



             [facility]

 

             Diastolic blood 2023 19:57:00 67 mm[Hg]                 Unive

rsity of



             [facility]

 

             Heart rate   2023 19:57:00 100 /min                  Universi

ty of



                                                                 [facility]

 

             Body temperature 2023 19:57:00 36.67 Dafne                 Univ

ersity of



                                                                 [facility]

 

             Respiratory rate 2023 19:57:00 15 /min                   Univ

ersity of



                                                                 [facility]

 

             Body height  2023 19:57:00 160 cm                    Universi

ty of



                                                                 [facility]



                                                                 [place]

 

             Body weight  2023 19:57:00 103.828 kg                Universi

ty of



                                                                 [facility]



                                                                 [place]

 

             BMI          2023 19:57:00 40.55 kg/m2               Universi

ty of



                                                                 [facility]

 

             Oxygen saturation in 2023 19:57:00 97 /min                   

[facility]



             Arterial blood by                                        [place] [facility]

[facility]



             Pulse oximetry                                        Branch

 

             Systolic blood 2022 16:54:00 103 mm[Hg]                Univer

sity of



             pressure                                            [facility]

 

             Diastolic blood 2022 16:54:00 65 mm[Hg]                 Unive

rsity of



             pressure                                            [facility]



                                                                 Branch

 

             Heart rate   2022 16:54:00 97 /min                   Universi

ty of



                                                                 [facility]



                                                                 Branch

 

             Body temperature 2022 16:54:00 36.33 [place]af[place]                 Univ

ersity of



                                                                 [facility]



                                                                 Branch

 

             Respiratory rate 2022 16:54:00 18 /min                   Univ

ersity of



                                                                 [facility]



                                                                 Branch

 

             Body height  2022 16:54:00 160 cm                    Universi

ty of



                                                                 [facility]



                                                                 Branch

 

             Body weight  2022 16:54:00 104.327 kg                Universi

ty of



                                                                 [facility]



                                                                 Branch

 

             BMI          2022 16:54:00 40.74 kg/m2               Universi

ty of



                                                                 [facility]



                                                                 Branch

 

             Systolic blood 2022 19:50:00 95 mm[Hg]                 Univer

sity of



             pressure                                            [facility]



                                                                 Branch

 

             Diastolic blood 2022 19:50:00 60 mm[Hg]                 Unive

rsity of



             pressure                                            [facility]



                                                                 Branch

 

             Heart rate   2022 19:49:00 71 /min                   Universi

ty of



                                                                 [facility]



                                                                 Branch

 

             Body temperature 2022 19:49:00 36.56 Dafne                 Univ

ersity of



                                                                 [facility]



                                                                 Branch

 

             Body weight  2022 19:49:00 103.874 kg                Universi

ty of



                                                                 [facility]



                                                                 Branch

 

             BMI          2022 19:49:00 39.31 kg/m2               Universi

ty of



                                                                 [facility]



                                                                 Branch

 

             Systolic blood 2022 14:50:00 105 mm[Hg]                Univer

sity of



             pressure                                            [facility]



                                                                 Branch

 

             Diastolic blood 2022 14:50:00 65 mm[Hg]                 Unive

rsity of



             pressure                                            [facility]



                                                                 Branch

 

             Heart rate   2022 14:50:00 83 /min                   Universi

ty of



                                                                 [facility]



                                                                 Branch

 

             Body temperature 2022 14:50:00 37.06 Daf[place]                 Univ

ersity of



                                                                 [facility]



                                                                 Branch

 

             Body weight  2022 14:50:00 104.418 kg                Universi

ty of



                                                                 [facility]



                                                                 Branch

 

             BMI          2022 14:50:00 39.51 kg/m2               Universi

ty of



                                                                 [facility]



                                                                 Branch

 

             Systolic blood 2022-10-16 15:21:00 135 mm[Hg]                Univer

sity of



             pressure                                            [facility]



                                                                 Branch

 

             Diastolic blood 2022-10-16 15:21:00 78 mm[Hg]                 Unive

rsity of



             pressure                                            [facility]



                                                                 Branch

 

             Heart rate   2022-10-16 15:21:00 77 /min                   Universi

ty of



                                                                 [facility]



                                                                 Branch

 

             Body temperature 2022-10-16 15:21:00 36.89 Daf[place]                 Univ

ersity of



                                                                 [facility]



                                                                 Branch

 

             Respiratory rate 2022-10-16 15:21:00 18 /min                   Univ

ersity of



                                                                 [facility]



                                                                 Branch

 

             Body weight  2022-10-16 15:21:00 102.059 kg                Universi

ty of



                                                                 [facility]



                                                                 Branch

 

             BMI          2022-10-16 15:21:00 38.62 kg/m2               Universi

ty of



                                                                 [facility]



                                                                 Branch

 

             Oxygen saturation in 2022-10-16 15:21:00 99 /min                   

[facility]



             Arterial blood by                                        [facility]

[place]



             Pulse oximetry                                        Branch

 

             Systolic blood 2022 16:50:00 104 mm[Hg]                Univer

sity of



             pressure                                            [facility]



                                                                 Branch

 

             Diastolic blood 2022 16:50:00 67 mm[Hg]                 Unive

rsity of



             pressure                                            [facility]



                                                                 Branch

 

             Heart rate   2022 16:50:00 78 /min                   Universi

ty of



                                                                 [facility]



                                                                 Branch

 

             Body temperature 2022 16:50:00 36.94 Dafne                 Univ

ersity of



                                                                 [facility]



                                                                 Branch

 

             Respiratory rate 2022 16:50:00 14 /min                   Univ

ersity of



                                                                 [facility]



                                                                 Branch

 

             Body height  2022 16:50:00 162.6 cm                  Universi

ty of



                                                                 [facility]



                                                                 Branch

 

             Body weight  2022 16:50:00 102.468 kg                Universi

ty of



                                                                 [facility]



                                                                 Branch

 

             BMI          2022 16:50:00 38.78 kg/m2               Universi

ty of



                                                                 [facility]



                                                                 Branch

 

             Oxygen saturation in 2022 16:50:00 99 /min                   

[facility]



             Arterial blood by                                        [place] [facility]

[facility]



             Pulse oximetry                                        Branch

 

             Systolic blood 2022 14:10:00 107 mm[Hg]                Univer

sity of



             pressure                                            [facility]



                                                                 Branch

 

             Diastolic blood 2022 14:10:00 70 mm[Hg]                 Unive

rsity of



             pressure                                            [facility]



                                                                 Branch

 

             Heart rate   2022 14:10:00 82 /min                   Universi

ty of



                                                                 [facility]



                                                                 Branch

 

             Body height  2022 14:10:00 162.6 cm                  Universi

ty of



                                                                 [facility]



                                                                 Branch

 

             Body weight  2022 14:10:00 101.152 kg                Universi

ty of



                                                                 [facility]



                                                                 Branch

 

             BMI          2022 14:10:00 38.28 kg/m2               [facility]

 

             Oxygen saturation in 2022 14:10:00 98 /min                   

University 



             Arterial blood by                                        [place] [facility]

[facility]



             Pulse oximetry                                        Branch







Procedures







                Procedure       Date / Time     Performing Clinician Source



                                Performed                       

 

                POCT URINALYSIS W/O 2023 00:00:00 Alejandra Finn   [facility]

 

                POCT URINALYSIS 2023 20:04:00 Tiffany Shankar [facility]

 

                POCT URINALYSIS W/O 2022 00:00:00 Alejandra Finn   [facility] OB TRANSVAGINAL 2022 13:37:08 Alejandra Finn   [facility]

 

                OB/GYN CLINIC   2022 06:01:00 Doctor Unassigned, No [facility]

 

                POCT URINALYSIS W/O 2022 00:00:00 Ruthy [facility] OB TRANSVAGINAL 2022 15:40:16 Alejandra Finn   [facility]

 

                FLU VACC ([id number]), 6 2022 14:54:30 Alejandra Finn   [facility]



                MO-64 YRS, .5ML, IM,                                 Medical Bra

[facility]



                QUAD (FLUCELVAX)                                 

 

                OB/GYN CLINIC   2022 06:01:00 Doctor Unassigned, No [facility]

 

                POCT PREGNANCY TEST 2022 00:00:00 Alejandra Finn   [facility]

 

                POCT URINALYSIS W/O 2022 00:00:00 Huma Alejandra [facility]

 

                RAPID INFLUENZA A/B 2022-10-16 15:32:00 Lela [facility]

 

                COVID-19 (ID NOW RAPID 2022-10-16 15:32:00 Lela Conrad [facility]

[facility] [place]



                TESTING)                                        [facility]

 

                CONSENT/REFUSAL FOR 2022-10-16 15:07:24 Doctor Unassigned, No Un

[facility]



                DIAGNOSIS AND TREATMENT                 Name            [facility]

 

                POCT MOLECULAR STREP 2022 17:01:00 Unknown, Attending Univ

[facility]

 

                ASSIGNMENT OF BENEFITS 2022 16:49:07 Doctor Unassigned, No

 [facility]



                                                Name            [facility]







Encounters







        Start   End     Encounter Admission Attending Care    Care    Encounter 

Source



        Date/Time Date/Time Type    Type    Clinicians Facility Department ID   

   

 

        2021         Emergency                 [facility]    [id number] 

Univers



        07:14:27                                                         ity [facility]

 

        2021         Emergency                 [facility]    [id number] 

Univers



        07:13:35                                                         ity [facility]

 

        2021-10-30         Outpatient P               [facility]    [facility]     [id number]

 Univers



        14:45:20                                                         ity [facility]

 

        2021-10-30         Outpatient X               [facility]    [facility]     [id number]

 Univers



        13:14:12                                                         ity [facility]

 

        2021-10-30         Emergency                 [facility]    [id number] 

Univers



        13:13:39                                                         ity of



                                                                        [facility]

 

        2021-10-29         Emergency                 [facility]    [id number] 

Univers



        20:50:56                                                         ity of



                                                                        [facility]

 

        2021-10-29         Emergency                 [facility]    [id number] 

Univers



        15:52:53                                                         [facility]

 

        2023 Outpatient R       [facility] [facility]    [id number]

[id number] Univers



        11:00:00 11:00:00                 [facility]

 

        2023 Outpatient R       SHANON  [facility]    [id number]

529 Univers



        08:30:00 08:30:00                 GIOVANNA sanchezcarlene [facility]

 

        2023 Outpatient ARNAV CLAUDIO  [facility]    [id number]

529 Univers



        08:30:00 08:30:00                 GIOVANNA sanchezcarlene [facility]

 

        2023 Telephone         Alejandra Finn [facility]    1.2.840.114 [id number]

[id number] Univers



        00:00:00 00:00:00                 Judah JOHNSON 350.1.13.10         i

ty of



                                                [place] 4.2.7.2.686         [place]

[place]



                                                PROF[place] [phone]         Me

dical



                                                NAL     134             [facility]                 

 

        2023 [facility]    1.2.840.114 [id number]

9645 Univers



        00:00:00 00:00:00                 [facility]  350.1.13.10         it

y of



                                        Candelario  [facility] 4.2.7.2.686         Sukh

as



                                                GERARDO?BLEA [phone]         Me

dical



                                                [place]    [address] [place] [address]                 

 

        2023 Telephone         Alejandra Finn [facility]    1.2.840.114 [id number]

[id number] Univers



        00:00:00 00:00:00                 Cam     [place] 350.1.13.10         i

ty of



                                                [place] 4.2.7.2.686         [place]

[place]



                                                PROFESS[place] [phone]         Me

dic[address]                 

 

        2023 Outpatient R       NIKKO [facility]    [id number]

[id number] Univers



        11:00:00 11:00:00                 RUTHY                           ity [facility]

 

        2023 Outpatient R       ALEJANDRA FINN [facility]    [id number]

26454 Univers



        13:45:00 14:42:12                                                 ity [facility]

 

        2023 Routine         Alejandra Finn [facility]    1.2.647.189 [id number]

[id number] Univers



        13:45:00 14:42:12 Prenatal         Cam     ALEX 350.1.13.10         

ity of



                        Visit                   [facility] 4.2.7.2.686         [place]

[place]



                                                PROFESS[place] [phone]         Me

dical



                                                NAL     [address] [place] [address]                 

 

        2023 Outpatient R       GRICELDA, [facility]    [id number]

8916 Univers



        19:00:00 19:00:00                 ATTENDING                         ity 

[facility]

 

        2023 Patient         CasePooja [facility]    1.2.840.114 [id number]

[id number] Univers



        00:00:00 00:00:00 Secure Msg         DEV       [place] 350.1.13.10       

  ity of



                                                [facility] 4.2.7.2.686         [place]

[place]



                                                PROFESS[place] [phone]         Me

dical



                                                NAL     [address] [place] [address]                 

 

        2023 Telephone         Huma Alejandra [facility]    1.2.840.114 10

[id number] Univers



        00:00:00 00:00:00                 Judah JOHNSON 350.1.13.10         i

ty of



                                                [facility] 4.2.7.2.686         [place]

[place]



                                                PROFESS[place] [phone]         Me

gricel [place]     134             [facility]                 

 

        2023 Technician         2, Adc Lab [facility]    1.2.840.114 

[id number] Univers



        08:00:00 08:15:00 Visit           Alejandra FinnYONATAN 350.1.13.10    

     ity of



                                                [facility] 4.2.7.2.686         [place]

[place]



                                                PROFESSIO [phone]         Me

gricel [place]     353             [facility]                 

 

        2023 Outpatient R       ALEJANDRA FINN [facility]    [id number]

[id number] Univers



        08:00:00 08:00:00                                                 ity of



                                                                        [facility]

 

        2023 Outpatient R       KI [facility]    [id number]

370 Univers



        14:00:00 14:44:20                 TIFFANY griffith o

f



                                                                        [facility]

 

        2023 Urgent          Tiffany Shankar [facility]    1.2.840

.114 [id number] 

Univers



        14:00:00 14:44:20 Care            Unknown, Attending HEALTH  350.1.13.10

         ity of



                                                [place] 4.2.7.2.686         Sukh

as



                                                GERARDO?BLEA [phone]         Me

gricel CALLAHAN    370             [facility]                 



                                                OFFICE                  



                                                [facility]                 

 

        2023 Brayden Claudio  [facility]    1.2.840.114 [id number]

40 Univers



        00:00:00 00:00:00                 [facility]  350.1.13.10         it

y of



                                                [place] 4.2.7.2.686         Sukh

as



                                                GERARDO?BLEA [phone]         Me

gricel CALLAHAN    044             [facility]                 

 

        2022 Outpatient R       ALEJANDRA FINN [facility]    [id number]

73496 Univers



        11:00:00 11:51:10                                                 ity of



                                                                        [facility]

 

        2022 Routine         Alejandra Finn [facility]    1.2.024.432 [id number]

8465 Univers



        11:00:00 11:51:10 Prenatal         Cam     [place] 350.1.13.10         

ity of



                        Visit                   [place] 4.2.7.2.686         [place]

[place]



                                                PROFESSIO [phone]         Me

dical



                                                NAL     134             [facility]                 

 

        2022 Outpatient R       ALEJANDRA FINN [facility]    [id number]

66105 Univers



        13:15:00 17:10:41                                                 ity of



                                                                        [facility]

 

        2022 Routine         Alejandra Finn [facility]    1.2.601.621 [id number]

3909 Univers



        13:15:00 17:10:41 Prenatal         Cam     [place] 350.1.13.10         

ity of



                        Visit                   [place] 4.2.7.2.686         [place]

[place]



                                                PROFESSIO [phone]         Me

dical



                                                NAL     134             [facility]                 

 

        2022 Orders          Doctor  MIKAYLA    1.2.840.114 [id number]

 Univers



        00:00:00 00:00:00 Only            Unassigned, AMMON   350.1.13.10       

  ity of



                                        [facility] 4.2.7.2.686         Sukh

as



                                                        [phone]         [address]

 

        2022 Technician         2, Adc Lab [facility]    1.2.840.114 

[id number] Univers



        11:30:00 11:45:00 Visit           Alejandra FinnTON 350.1.13.10    

     ity of



                                                [place] 4.2.7.2.686         [place]

[place]



                                                PROFESSIO [phone]         Me

dical



                                                NAL     353             [facility]                 

 

        2022 Outpatient R       ALEJANDRA FINN [facility]    [id number]

[id number] Univers



        11:30:00 11:30:00                                                 ity of



                                                                        [facility]

 

        2022 Technician         Issac, Adc Lab Main [facility]    1.2.8

40.114 [id number] 

Univers



        11:00:00 11:15:00 Visit           Alejandra FinnTON 350.1.13.10    

     ity of



                                                [place] 4.2.7.2.686         [place]

[place]



                                                PROFESSIO [phone]         Me

dical



                                                NAL     353             [facility]                 

 

        2022 Outpatient R       HUMA ALEJANDRA [facility]    [id number]

91881 Univers



        11:00:00 11:00:00                                                 ity of



                                                                        [facility]

 

        2022 Technician         2, Adc Lab [facility]    1.2.840.114 

[id number] Univers



        10:15:00 10:30:00 Visit           HumaCaesardinah JOHNSON 350.1.13.10    

     ity of



                                                [place] 4.2.7.2.686         [place]

[place]



                                                PROFESSIO [phone]         Me

dical



                                                [facility]     353             [facility]                 

 

        2022 Outpatient R       ALEJANDRA FINN [facility]    [id number]

74322 Univers



        08:45:00 09:33:45                                                 ity of



                                                                        [facility]

 

        2022 Initial         Huma Alejandra [facility]    1.2.743.013 3531

2610 Univers



        08:45:00 09:33:45 Prenatal         Judah JOHNSON 350.1.13.10         

ity of



                        Visit                   [place] 4.2.7.2.686         [place]

[place]



                                                PROFESSIO [phone]         Me

[facility]     134             [facility]                 

 

        2022 Orders          Doctor  MIKAYLA    1.2.840.114 [id number]

55 Univers



        00:00:00 00:00:00 Only            Unassigned, AMMON   350.1.13.10       

  ity of



                                        [facility] 4.2.7.2.686         Sukh

as



                                                        [phone]         [address]

 

        2022-10-19 2022-10-19 Brayden Claudio  [facility]    1.2.840.114 [id number]

64 Univers



        00:00:00 00:00:00                 [facility]  350.1.13.10         it

y of



                                                [place] 4.2.7.2.686         Sukh

as



                                                GERARDO?BLEA [phone]         [facility]    [address]             [place]



                                                MEDICAL                 



                                                OFFICE                  



                                                BUILDING                 

 

        2022-10-16 2022-10-16 Emergency X       GUSTABO [facility]    ERT     [id number]

[id number] Univers



        10:24:00 11:53:00                 LELA                         ity of



                                                                        [facility]

 

        2022-10-16 2022-10-16 Emergency         Gustabo [facility]    1.2.840.114 [id number]

[id number] Univers



        10:24:00 11:53:00                 Lela [facility] 350.1.13.10         i

ty of



                                                [place] 4.2.7.2.686         [place]

[place]



                                                [place]  [phone]         Medi

[place]ennox



                                                        [address]             [place]

 

        2022 Letter          MIKAYLA Lopez    1.2.840.114 [id number]

04 Univers



        00:00:00 00:00:00 (Out)           Barbie VERDE   350.1.13.10         it

y of



                                                [facility] [place] 4.2.7.2.686         Sukh

as



                                                        [phone]         Medi

[place]ennox



                                                        [address]             [place]

 

        2022 Urgent          Anayeli Peña [facility]    1.2.840.114

 [id number] Univers



        12:00:00 12:20:00 Care            Unknown, Tiffany HEALTH  350.1.13.10

         ity of



                                                [place] 4.2.7.2.686         Sukh

as



                                                GERARDO?BLEA [phone]         [facility]    [address]             [place]



                                                MEDICAL                 



                                                OFFICE                  



                                                BUILDING                 

 

        2022 Outpatient R       MARIANA [facility]    [id number]

[id number] Univers



        12:00:00 12:15:32                 ANAYELI griffith of



                                                                        [facility]

 

        2022 Orders          Doctor  MIKAYLA    1.2.840.114 [id number]

02 Univers



        00:00:00 00:00:00 Only            Unassigned, AMMON   350.1.13.10       

  ity of



                                        [facility] 4.2.7.2.686         Sukh

as



                                                        [phone]         Medi

lennox



                                                        [address]             [place]

 

        2022 Refill          Shanon  [facility]    1.2.840.114 [id number]

65 Univers



        00:00:00 00:00:00                 Giovanan   HEALTH  350.1.13.10         it

y of



                                                [place] 42.7.2.686         Sukh

as



                                                GERARDO?BLEA [phone]         Me

gricel



                                                [facility]    [address]             [place]



                                                MEDICAL                 



                                                OFFICE                  



                                                BUILDING                 

 

        2022 Technician         Lab, Ang - Db [facility]    1.2.840.1

14 [id number] 

Univers



        09:45:00 10:00:00 Visit           Giovanna Claudio  350.1.13.10      

   ity of



                                                [place] 4.2.7.2.686         Sukh

as



                                                GERARDO?BLEA [phone]         Me

gricel CALLAHAN    [address]             [place]



                                                MEDICAL                 



                                                OFFICE                  



                                                BUILDING                 

 

        2022 Outpatient R       SHANON  [facility]    [id number]

212 Univers



        09:45:00 09:45:00                 GIOVANNA griffith [facility]

 

        2022 [facility]       [facility]    [id number]

212 Univers



        09:00:00 09:40:59                 GIOVANNA griffith [facility]

 

        2022 Office          [facility]    1.2.840.114 [id number]

87 Univers



        09:00:00 09:40:59 Visit           Giovanna   [facility]  350.1.13.10         it

y of



                                                [place] 4.2.7.2.686         Sukh

as



                                                GERARDO?BLEA [phone]         Me

gricel [place]    044             [facility]                 



                                                OFFICE                  



                                                [facility]                 

 

        2022 Outpatient R       BHARATHI [facility]    53652

91053 Univers



        14:20:00 14:20:00                 OMAYEMI                         [facility]

 

        2022 Telephone         [facility]    1.2.840.114 [id number]

[id number] Univers



        00:00:00 00:00:00                 [facility]  350.1.13.10         it

y of



                                                [place] 4.2.7.2.686         Sukh

as



                                                GERARDO?BLEA [phone]         Me

gricel [place]    [address]             [place]



                                                MEDICAL                 



                                                OFFICE                  



                                                [facility]                 

 

        2022 Outpatient R       MARIANA [facility]    [id number]

0557 Univers



        20:40:00 20:52:29                 ANAYELI                           [facility]

 

        2022 [facility]          [facility]    1.2.840.114 [id number]

218 Univers



        20:40:00 20:52:29 Care            [facility]  350.1.13.10         it

y of



                                                [place] 4.2.7.2.686         Sukh

as



                                                GERARDO?BLEA [phone]         Me

gricel [place]    [address]             [place]



                                                MEDICAL                 



                                                OFFICE                  



                                                BUILDING                 

 

        2022 [facility]patient R       [facility]    [id number]

24473 Univers



        15:45:00 16:19:31                 RUTHY                           [facility]

 

        2022 Office          [facility]    1.2.901.466 6423

5489 Univers



        15:45:00 16:19:31 Visit           Ruthy JOHNSON 350.1.13.10         i

ty 



                                                [facility] 4.2.7.2.686         [place]

[place]



                                                PROFESSIO [phone]         Me

dical



                                                NAL     134             [facility]                 

 

        2022 Outpatient R               [facility]    [id number]

826 Univers



        10:00:00 10:00:00                                                 [facility]

 

        2022-03-10 2022-03-10 Outpatient R       [facility]    [id number]

19455 Univers



        10:00:00 10:20:56                 VERO                           [facility]

 

        2022 Outpatient R       [facility]    [id number]

42042 Univers



        15:00:00 15:00:00                 [facility]

 

        2022 Outpatient R       [facility]    [id number]

49067 Univers



        08:15:00 08:15:00                 [facility]

 

        2022 Technician         Issac, Adc Lab Main [facility]    1.2.8

40.114 [id number] 

Univers



        17:15:00 17:30:00 Visit           Ruthy El 350.1.13.10  

       ity 



                                                [facility] 4.2.7.2.686         [place]

[place]



                                                PROFESSIO [phone]         Me

dical



                                                NAL     353             [facility]                 

 

        2022 Outpatient R       [facility]    [id number]

13313 Univers



        17:15:00 17:15:00                 RUTHY                           [facility]

 

        2022 Outpatient R       NIKKO [facility]    56316

25743 Univers



        17:15:00 17:15:00                 [facility]

 

        2022 Outpatient R       NIKKO [facility]    77041

39568 Univers



        16:15:00 16:52:41                 RUTHY                           ity of



                                                                        [facility]

 

        2022 Office          Nikko [facility]    1.2.597.902 4265

5586 Univers



        16:15:00 16:52:41 Visit           Ruthy   ALEX 350.1.13.10         i

ty of



                                                [place] 4.2.7.2.686         [place]

[place]



                                                PROFESSIO [phone]         [address]                 

 

        2022 Patient         Mariana [facility]    1.2.840.114 [id number]

627 Univers



        00:00:00 00:00:00 Secure [facility]  350.1.13.10        

 ity of



                                                [place] 4.2.7.2.686         Sukh

as



                                                GERARDO?BLEA [phone]         [address]                 



                                                OFFICE                  



                                                [facility]                 

 

        2022 Orders          Doctor  MIKAYLA    1.2.840.114 [id number]

04 Univers



        00:00:00 00:00:00 Only            Unassigned, AMMON   350.1.13.10       

  ity of



                                        [facility] 4.2.7.2.686         Sukh

as



                                                        [phone]         [address]

 

        2022 Urgent          Provider, [facility] 

   1.2.840.114 

[id number]                                Univers



        18:20:00 18:40:00 Care            Vasquez Galloway  350.1.13.10 

        ity of



                                                [place] 4.2.7.2.686         Sukh

as



                                                GERARDO?BLEA [phone]         [address]                 



                                                OFFICE                  



                                                [facility]                 

 

        2022 Outpatient R       TEO [facility]    [id number]

[id number] Univers



        18:20:00 18:20:00                 VASQUEZ louie



                                                                        [facility]

 

        2022 Outpatient R       ALEJANDRA FINN [facility]    [id number]

[id number] Univers



        15:00:00 16:25:08                                                 ity of



                                                                        [facility]

 

        2022 Office          Alejandra Finn [facility]    1.2.615.250 [id number]

8940 Univers



        15:00:00 16:25:08 Visit           Judah JOHNSON 350.1.13.10         i

ty of



                                                [place] 4.2.7.2.686         [place]

[place]



                                                [facility] [phone]         Me

dical



                                                NAL     134             Branch



                                                BUILDING                 

 

        2022 Outpatient R       ALEJANDRA FINN [facility]    [id number]

97505 Univers



        15:00:00 16:25:08                                                 ity of



                                                                        [facility]

 

        2022 Orders          Doctor DEGROOT    1.2.840.114 [id number]

34 Univers



        00:00:00 00:00:00 Only            Unassigned, AMMON   350.1.13.10       

  ity of



                                        [facility] [place] [facility] 4.2.7.2.686         Sukh

as



                                                        [phone]         Medi

lennox



                                                        009             Branch

 

        2021-10-15 2021-10-15 Emergency         YOVANNY Worrell [facility]    1.2.840.114 [id number]

[id number] Univers



        17:06:00 21:12:00                 Silvializett Johnson 350.1.13.10         i

ty of



                                                [place] 4.2.7.2.686         [place]

[place]



                                                [place]  [phone]         Medi

lennox



                                                        084             Branch

 

        2021 Urgent          Rocky Marguerite [facility]    1.2.840.114 [id number]

[id number] Univers



        20:11:55 20:35:18 Care            Teo, [facility] [address] [facility]  350.1.13.10 

        ity of



                                                [place] 4.2.7.2.686         Sukh

as



                                                Gerardo?Blea [phone]         Me

dical



                                                kney    [address]             [place]



                                                Medical                 



                                                Office                  



                                                Building                 

 

        2021 Outpatient R       TEO [facility]    [id number]

8904 Univers



        20:20:00 20:20:00                 VASQUEZ mccormack

f



                                                                        [facility]

 

        2021 Letter          Becki Gimenez    1.2.840.114 [id number]

[id number] Univers



        00:00:00 00:00:00 (Out)                   AMMON   350.1.13.10         it

y of



                                                HOSPITAL 4.2.7.2.686         Sukh

as



                                                        [phone]         Medi

lennox



                                                        019             Branch

 

        2021 Outpatient R       LINNETTE  [facility]    [id number]

542 Univers



        11:10:00 11:10:00                 CHARISSA                           ity of



                                                                        [facility]

 

        2021 Orders          Doctor  MIKAYLA    1.2.840.114 [id number]

62 Univers



        00:00:00 00:00:00 Only            Unassigned, AMMON   350.1.13.10       

  ity of



                                        [facility] 4.2.7.2.686         Sukh

as



                                                        [phone]         Medi

lennox



                                                        009             [place]

 

        2021 Outpatient R       NIKKO [facility]    [id number]

01718 Univers



        13:00:00 13:00:00                 RUTHY                           ity [facility]

 

        2021 Emergency         [facility]    1.2.840.114 [id number]

[id number] Univers



        17:44:00 19:10:00                 Gi Johnson 350.1.13.10         

ity of



                                                [place] 4.2.7.2.686         [place]

[place]



                                                [place]  [phone]         Medi

lennox



                                                        084             Branch

 

        2021 Orders          Doctor  MIKAYLA    1.2.840.114 [id number]

53 Univers



        00:00:00 00:00:00 Only            Unassigned, AMMON   350.1.13.10       

  ity of



                                        [facility] 4.2.7.2.686         Suk[place]

as



                                                        [phone]         Medi

lennox



                                                        009             Branch

 

        2021 Nurse           MIKAYLA Phillips    1.2.713.073 [id number]

8059 Univers



        00:00:00 00:00:00 Triage          Kane   AMMON   350.1.13.10         it

y of



                                                HOSPITAL 4.2.7.2.686         Suk[place]

as



                                                        [phone]         Medi

lennox



                                                        019             Branch

 

        2021 Patient         Simone  [facility]    1.2.840.114 [id number]

26 Univers



        00:00:00 00:00:00 Outreach         Garrett RAMON 350.1.13.10         i

ty of



                                        [facility]    4.2.7.2.686         [place]

[place]



                                                [place] [phone]         Me

dical



                                                        388             Branch

 

        2021-03-15 2021-03-15 Office          Alejandra Finn [facility]    1.2.037.599 [id number]

7866 Univers



        10:33:34 11:03:34 Visit           Judah Johnson 350.1.13.10         i

ty of



                                                [place] 4.2.7.2.686         [place]

[place]



                                                [place] [phone]         [address]                 

 

        2021-03-15 2021-03-15 Outpatient R       ALEJANDRA FINN [facility]    [id number]

[id number] Univers



        10:30:00 10:30:00                                                 ity of



                                                                        [facility]

 

        2021-02-10 2021-02-10 Office          Huma [facility]    1.2.175.952 [id number]

7029 Univers



        16:03:14 16:59:14 Visit           Judah Johnson 350.1.13.10         i

ty of



                                                [place] 4.2.7.2.686         [place]

[place]



                                                [place] [phone]         [address]                 

 

        2021-02-10 2021-02-10 Outpatient R       HUMA [facility]    [id number]

[id number] Univers



        15:45:00 15:45:00                                                 ity [facility]

 

        2021-02-10 2021-02-10 Orders          Doctor  MIKAYLA    1.2.840.114 [id number]

44 Univers



        00:00:00 00:00:00 Only            Unassigned, AMMON   350.1.13.10       

  ity of



                                        [facility] [place] [facility] 4.2.7.2.686         Suk[place]

as



                                                        [phone]         [address]

 

        2021 Routine         [facility]    1.2.242.316 [id number]

0997 Univers



        11:02:55 11:17:55 Prenatal         Ruthy Johnson 350.1.13.10         

ity of



                        Visit                   [place] 4.2.7.2.686         [place]

[place]



                                                [place] [phone]         [address]                 

 

        2021 Outpatient R       NIKKO [facility]    [id number]

54959 Univers



        11:00:00 11:00:00                 RUTHY                           itcarlene [facility]

 

        2021 Nurse           Nurse, [facility]    

1.2.840.114 [id number]

                                        Univers



        16:23:11 16:38:11 Visit           Alejandra Finn 350.1.13.10    

     ity of



                                                [place] 4.2.7.2.686         [place]

[place]



                                                [place] [phone]         Me

dical



                                                nal     134             [facility]                 

 

        2021 Outpatient R               [facility]    [id number]

148 Univers



        15:00:00 15:00:00                                                 ity of



                                                                        [facility]

 

        2021 Orders          Doctor DEGROOT    1.2.840.114 [id number]

09 Univers



        00:00:00 00:00:00 Only            Unassigned, AMMON   350.1.13.10       

  ity of



                                        [facility] 4.2.7.2.686         Sukh

as



                                                        [phone]         Medi

lennox



                                                        009             [place]

 

        2021 Technician         1, [facility]    1.2.840.114 

[id number] Univers



        10:58:34 11:13:34 Visit           Alejandra Finn 350.1.13.10    

     ity of



                                                [place] 4.2.7.2.686         [place]

[place]



                                                [place]  [phone]         Medi

lennox



                                                        353             [place]

 

        2021 Routine         [facility]    1.2.840.1

14 [id number] 

Univers



        09:07:10 10:27:30 Prenatal         Alejandra Finn 350.1.13.10   

      ity of



                        Visit                   [place] 4.2.7.2.686         [place]

[place]



                                                [place] [phone]         Me

dical



                                                nal     134             [facility]                 

 

        2021 Outpatient R               [facility]    [id number]

192 Univers



        09:00:00 09:00:00                                                 ity of



                                                                        [facility]

 

        2021 Hospital         Alejandra Finn [facility]    1.2.840.114

 [id number] Univers



        01:11:00 02:50:00 Encounter         Gladys Anthony 350.1.13.10    

     ity of



                                                [place] 4.2.7.2.686         [place]

[place]



                                                [place]  [phone]         Medi

lennox



                                                        083             [place]

 

        2021 Nurse           Becki Gimenez    1.2.840.114 [id number]

[id number] Univers



        00:00:00 00:00:00 Triage                  AMMON   350.1.13.10         it

y of



                                                HOSPITAL 4.2.7.2.686         Sukh

as



                                                        [phone]         Medi

lennox



                                                        019             [place]

 

        2020 Routine         Room, [facility]    1.2.840.1

14 [id number] 

Univers



        08:32:11 09:57:27 Prenatal         Finn Alejandra Johnson 350.1.13.10   

      ity of



                        Visit                   [place] 4.2.7.2.686         [place]

[place]



                                                [place] [phone]         Me

dical



                                                nal     134             [facility]                 

 

        2020 Outpatient R               [facility]    [id number]

545 Univers



        09:00:00 09:00:00                                                 ity of



                                                                        [facility]

 

        2020 Orders          Doctor  MIKAYLA    1.2.840.114 [id number]

81 Univers



        00:00:00 00:00:00 Only            Unassigned, AMMON   350.1.13.10       

  ity of



                                        [facility] 4.2.7.2.686         [place]

as



                                                        [phone]         Medi

lennox



                                                        009             [place]

 

        2020 Technician         2, [facility]    1.2.840.114 

[id number] Univers



        11:28:38 11:43:38 Visit           Alejandra Finn 350.1.13.10    

     ity of



                                                [place] 4.2.7.2.686         [place]

[place]



                                                [place] [phone]         Me

dical



                                                nal     353             [facility]                 

 

        2020 Outpatient R               [facility]    [id number]

551 Univers



        11:15:00 11:15:00                                                 ity of



                                                                        [facility]

 

        2020 Case            Nikko [facility]    1.2.092.695 [id number]

3200 Univers



        00:00:00 00:00:00 Management         Ruthy Johnson 350.1.13.10       

  ity of



                                                [place] 4.2.7.2.686         [place]

[place]



                                                [place] [phone]         Me

dical



                                                nal     134             [facility]                 

 

        2020 Telephone         Alejandra Finn [facility]    1.2.840.114 [id number]

[id number] Univers



        00:00:00 00:00:00                 Judah Johnson 350.1.13.10         i

ty of



                                                [place] 4.2.7.2.686         [place]

[place]



                                                [place] [phone]         Me

dic[address]                 

 

        2020 Telephone         Nikko [facility]    1.2.840.114 [id number]

[id number] Univers



        00:00:00 00:00:00                 Ruthy Johnson 350.1.13.10         i

ty of



                                                [place] 4.2.7.2.686         [place]

[place]



                                                [place] [phone]         [address]                 

 

        2020 Emergency         Razia Sanchez [facility]    1.2.840

.114 [id number] 

Univers



        12:16:00 14:20:00                 Jose Hernández 350.1.13.10  

       ity of



                                        [place] 4.2.7.2.686         

[facility]  [phone]         Medi

lennox



                                                        083             [place]

 

        2020 Nurse           MIKAYLA Lopez    1.2.840.114 [id number]

46 Univers



        00:00:00 00:00:00 Triage          Barbie PERSON AMMON   350.1.13.10         it

y of



                                                HOSPITAL 4.2.7.2.686         [place]

as



                                                        [phone]         Medi

lennox



                                                        019             [place]

 

        2020 Routine         Ruthy El [facility]    1.2.840.11

4 [id number] Univers



        13:25:36 13:40:36 Prenatal         Alejandra Finn 350.1.13.10   

      ity of



                        Visit                   [place] 4.2.7.2.686         [place]

[place]



                                                [place] [phone]         [address]                 

 

        2020 Outpatient R       ALEJANDRA FINN [facility]    [id number]

10598 Univers



        13:15:00 13:15:00                                                 ity of



                                                                        [facility]

 

        2020 Routine         Nikko [facility]    1.2.087.067 [id number]

5951 Univers



        08:52:47 09:07:47 Prenatal         Ruthy Johnson 350.1.13.10         

ity of



                        Visit                   [place] 4.2.7.2.686         [place]

[place]



                                                [place] [phone]         Me

dical



                                                nal     134             [facility]                 

 

        2020 Outpatient R       NIKKO [facility]    [id number]

28474 Univers



        08:30:00 08:30:00                 RUTHY                           ity [facility]

 

        2020 Routine         Alejandra Finn [facility]    1.2.303.468 [id number]

0900 Univers



        08:11:06 08:56:33 Prenatal         Judah Johnson 350.1.13.10         

ity of



                        Visit                   [place] 4.2.7.2.686         [place]

[place]



                                                [place] [phone]         Me

dical



                                                nal     134             [facility]                 

 

        2020 Outpatient R       ALEJANDRA FINN [facility]    [id number]

12719 Univers



        08:00:00 08:00:00                                                 ity [facility]

 

        2020 Routine         [facility]    1.2.870.865 [id number]

1988 Univers



        11:12:54 12:34:32 Prenatal         Ruthy Johnson 350.1.13.10         

ity of



                        Visit                   [place] 4.2.7.2.686         [place]

[place]



                                                [place] [phone]         [facility]     134             [facility]                 

 

        2020 Outpatient R       NIKKO [facility]    [id number]

69227 Univers



        11:15:00 11:15:00                 [facility]

 

        2020-10-29 2020-10-29 Nurse           Annamarie DEGROOT    1.2.840.114 [id number]

[id number] Univers



        00:00:00 00:00:00 Triage          Caroline villegas   350.1.13.10        

 ity of



                                                [facility] [place] 4.2.7.2.686         [place]

as



                                                        [phone]         [address]

 

        2020-10-12 2020-10-12 Technician         2, [facility]    1.2.840.114 

[id number] Univers



        09:47:13 10:02:13 Visit           Alejandra Finn 350.1.13.10    

     ity of



                                                [place] 4.2.7.2.686         [place]

[place]



                                                [place] [phone]         Me

dical



                                                Pending [facility] to [facility]     353             [facility]                 

 

        2020-10-12 2020-10-12 Routine         Alejandra Finn [facility]    1.2.257.461 [id number]

8138 Univers



        08:38:19 09:43:44 Prenatal         Judah Johnson 350.1.13.10         

ity of



                        Visit                   [place] 4.2.7.2.686         [place]

[place]



                                                [place] [phone]         [address]                 

 

        2020-10-12 2020-10-12 Outpatient R       HUMA ALEJANDRA [facility]    [id number]

21723 Univers



        08:30:00 08:30:00                                                 ity of



                                                                        [facility]

 

        2020-10-12 2020-10-12 Case            Nkiko [facility]    1.2.282.459 [id number]

6034 Univers



        00:00:00 00:00:00 Management         Ruthy Johnson 350.1.13.10       

  ity of



                                                [place] 4.2.7.2.686         [place]

[place]



                                                [place] [phone]         [address]                 

 

        2020-10-02 2020-10-02 Emergency         [facility]    1.2.488.379 [id number]

5666 [facility]



        17:21:00 19:23:00                 Carolin Johnson 350.1.13.10         

ity of



                                                [place] 4.2.7.2.686         [place]

[place]



                                                [place]  [phone]         Medi

lennox



                                                        084             [place]

 

        2020-10-02 2020-10-02 Nurse           Becki Gimenez    1.2.840.114 [id number]

[id number] Univers



        00:00:00 00:00:00 Triage                  AMMON   350.1.13.10         it

y of



                                                HOSPITAL 4.2.7.2.686         [place]

as



                                                        [phone]         Medi

lennox



                                                        019             [place]

 

        2020 Routine         Ruthy El [facility]    1.2.840.11

4 [id number] Univers



        08:00:27 08:46:51 Prenatal         Alejandra Finn Alex 350.1.13.10   

      ity of



                        Visit                   [place] 4.2.7.2.686         [place]

[place]



                                                [place] [phone]         [address]                 

 

        2020 Outpatient R       HUMACAESAREN [facility]    [id number]

34276 Univers



        08:00:00 08:00:00                                                 ity of



                                                                        [facility]

 

        2020 Emergency         [facility]    1.2.727.761 [id number]

1877 Univers



        22:17:00 00:16:00                 Danny Canaleston 350.1.13.10         

ity of



                                                [place] 4.2.7.2.686         [place]

[place]



                                                [place]  [phone]         Medi

lennox



                                                        084             [place]

 

        2020 Technician         Ultrasound, [facility]    1.2

.840.114 [id number] 

Univers



        10:21:36 11:43:21 Visit           Dell Pillai OB/[facility]  350.1.13.1

0         ity of



                                                REGIONAL 4.2.7.2.686         Sukh

as



                                                MATERNAL [phone]         Med

ical



                                                & CHILD [address] [place] [address]                 

 

        2020 Outpatient R               [facility]    [id number]

007 Univers



        10:15:00 10:15:00                                                 ity of



                                                                        [facility]

 

        2020 Nurse           Becki Gimenez    1.2.840.114 [id number]

[id number] Univers



        00:00:00 00:00:00 Triage                  AMMON   350.1.13.10         it

y of



                                                [facility] [place] 4.2.7.2.686         Sukh

as



                                                        [phone]         Medi

[place]



                                                        [address]             [place]

 

        2020 Technician         2, Adc Lab [facility]    1.2.840.114 

[id number] Univers



        13:26:39 13:41:39 Visit           Alejandra Finn 350.1.13.10    

     ity of



                                                [place] 4.2.7.2.686         [place]

[place]



                                                [place] [phone]         [facility]     353             [facility]                 

 

        2020 Routine         [facility]    1.2.446.827 0751

5933 Univers



        11:15:28 11:30:28 Prenatal         Ruthy Johnson 350.1.13.10         

ity of



                        Visit                   [place] 4.2.7.2.686         [place]

[place]



                                                Profess[place] [phone]         Me

dical



                                                nal     134             [facility]                 

 

        2020 Outpatient R       NIKKO [facility]    [id number]

[id number] Univers



        11:15:00 11:15:00                 RUTHY                           ity [facility]

 

        2020 Telephone         Alejandra Finn [facility]    1.2.840.114 [id number]

[id number] Univers



        00:00:00 00:00:00                 Cam     [place] 350.1.13.10         i

ty of



                                                [place] 4.2.7.2.686         [place]

[place]



                                                [place]fess[place] [phone]         [facility]     134             [facility]                 

 

        2020 Case            Alejandra Finn [facility]    1.2.203.619 [id number]

[id number] Univers



        00:00:00 00:00:00 Management         Cam     [place] 350.1.13.10       

  ity of



                                                [place] 4.2.7.2.686         [place]

[place]



                                                [place] [phone]         [address]                 

 

        2020 Orders          Doctor  MIKAYLA    1.2.840.114 [id number]

32 Univers



        00:00:00 00:00:00 Only            Unassigned, AMMON   350.1.13.10       

  ity of



                                        [facility] 4.2.7.2.686         Sukh

as



                                                        [phone]         Medi

lennox



                                                        009             [place]

 

        2020 Telephone         Alejandra Finn [facility]    1.2.840.114 [id number]

[id number] Univers



        00:00:00 00:00:00                 Cam     [place] 350.1.13.10         i

ty of



                                                [place] 4.2.7.2.686         [place]

[place]



                                                [place] [phone]         [address]                 

 

        2020 Outpatient R               [facility]    [id number]

375 Univers



        13:00:00 13:00:00                                                 ity of



                                                                        [facility]

 

        2020 Telemedici         Leticia Newman [facility]    1.2.8

40.114 [id number] 

Univers



        07:39:10 08:39:10 ne Visit         Maikol Staton SPECIALTY 350.1.13.10  

       ity of



                                                [place]     4.2.7.2.686         [place]

[place]



                                                COLONY  [phone]         Medi

[place]ennox



                                                        161             [place]

 

        2020 Case            Mejiaangelica [facility]    1.2.252.152 [id number]

5243 Univers



        00:00:00 00:00:00 Management         Ruthy Johnson 350.1.13.10       

  ity of



                                                [place] 4.2.7.2.686         [place]

[place]



                                                [place] [phone]         Me

dical



                                                nal     134             [facility]                 

 

        2020 Routine         Alejandra Finn [facility]    1.2.026.417 5443

0353 Univers



        08:19:52 09:06:16 Prenatal         Cam     [place] 350.1.13.10         

ity of



                        Visit                   [place] 4.2.7.2.686         [place]

[place]



                                                [place] [phone]         Me

dical



                                                nal     134             [facility]                 

 

        2020 Outpatient R       ALEJANDRA FINN [facility]    [id number]

[id number] Univers



        08:30:00 08:30:00                                                 ity of



                                                                        [facility]

 

        2020 Orders          Doctor  MIKAYLA    1.2.840.114 [id number]

65 Univers



        00:00:00 00:00:00 Only            Unassigned, AMMON   350.1.13.10       

  ity of



                                        [facility] 4.2.7.2.686         Sukh

as



                                                        [phone]         [address]

 

        2020 Outpatient R       ALEJANDRA FINN [facility]    [id number]

[id number] Univers



        08:15:00 08:15:00                                                 ity of



                                                                        [facility]

 

        2020 Laboratory         Lab, Adc Fam Pob I [facility]    1.2.

840.114 [id number] 

Univers



        14:00:00 14:20:00 Only            LeannenePaige [facility]  350.1.13.10    

     ity of



                                                [place] 4.2.7.2.686         [place]ukh

as



                                                Professio [phone]         Me

dical



                                                nal     044             [place]



                                                Office                  



                                                Building                 



                                                One                     

 

        2020 Outpatient R       JUSTINE  [facility]    [id number]

496 Univers



        14:00:00 14:00:00                 PAIGE                         ity of



                                                                        [facility]

 

        2020 Telephone         Pcp,    [facility]    1.2.288.838 [id number]

1543 Univers



        00:00:00 00:00:00                 Patient [place] 350.1.13.10         i

ty of



                                        Does Not [place] 4.2.7.2.686         Sukh

as



                                        Have A  Professio [phone]         Me

dical



                                                nal     134             [facility]                 

 

        2020 Technician         Ultrasound, [facility]    1.2

.840.114 [id number] 

Univers



        15:17:58 16:19:33 Visit           Silvia Johnson OB/GY[facility]  350.1.

13.10         ity of



                                                REGIONAL 4.2.7.2.686         Sukh

as



                                                MATERNAL [phone]         Med

ical



                                                & CHILD [address] [place] [address]                 

 

        2020 Outpatient P               [facility]    [id number]

056 Univers



        15:15:00 15:15:00                                                 ity of



                                                                        [facility]

 

        2020 Case            Mejiaangelica [facility]    1.2.184.580 [id number]

[id number] Univers



        00:00:00 00:00:00 Management         Ruthy Johnson 350.1.13.10       

  ity of



                                                [place] 4.2.7.2.686         [place]

[place]



                                                [place]fess[place] [phone]         Me

dical



                                                nal     [address] [place] [address]                 

 

        2020 Routine         Ruthy El [facility]    1.2.840.11

4 [id number] Univers



        10:11:42 13:46:28 Prenatal         Alejandra Finn 350.1.13.10   

      ity of



                        Visit                   [place] 4.2.7.2.686         [place]

[place]



                                                [place]fess[place] [phone]         Me

dical



                                                nal     [address] [place] [address]                 

 

        2020 Outpatient R       ALEJANDRA FINN [facility]    [id number]

12387 Univers



        10:15:00 10:15:00                                                 ity of



                                                                        [facility]

 

        2020 Orders          Doctor DEGROOT    1.2.840.114 [id number]

61 Univers



        00:00:00 00:00:00 Only            Unassigned, AMMON   350.1.13.10       

  ity of



                                        [place] [facility] [place] 4.2.7.2.686         Sukh

as



                                                        [phone]         [address]

 

        2020 Outpatient R       ALEJANDRA FINN [facility]    [id number]

[id number] Univers



        14:00:00 14:57:25                                                 ity of



                                                                        [facility]

 

        2020 Telemedici         Alejandra Finn [facility]    1.2.840.114 [id number]

[id number] Univers



        08:17:40 14:57:25 ne Visit         Judah Johnson 350.1.13.10         

ity of



                                                [place] 4.2.7.2.686         [place]

[place]



                                                [place] [phone]         Me

dical



                                                [address]                 

 

        2020 Telemedici         Alejandra Finn [facility]    1.2.840.114 [id number]

[id number] 



        08:17:40 14:57:25 ne Visit         Judah     Alex 350.1.13.10         



                                                [place] 4.2.7.2.686         



                                                Professio [phone]         



                                                [address]                 

 

        2020 [facility] X       [facility]     [id number]

64 Univers



        22:56:55 01:29:00                 DUC                           ity [facility]

 

        2020 Emergency         [facility]    1.2.779.420 [id number]

9618 [facility]



        22:56:55 01:29:00                 Duc Johnson 350.1.13.10         i

ty of



                                                [place] 4.2.7.2.686         [place]

[place]



                                                [place]  [phone]         [address]

 

        2020 Emergency         [facility]    1.2.584.965 [id number]

9618 



        22:56:55 01:29:00                 Duc Johnson 350.1.13.10         



                                                [place] 4.2.7.2.686         



                                                [place]  [phone]         



                                                        [facility]             

 

        2020 Orders          Doctor  MIKAYLA    1.2.840.114 [id number]

16 Univers



        00:00:00 00:00:00 Only            Unassigned, AMMON   350.1.13.10       

  ity of



                                        [facility] [place] 4.2.7.2.686         Sukh

as



                                                        [phone]         Medi

lennox



                                                        009             [place]

 

        2020 Orders          Doctor  MIKAYLA    1.2.840.114 [id number]

16 



        00:00:00 00:00:00 Only            Unassigned, AMMON   350.1.13.10       

  



                                        [facility] [place] 4.2.7.2.686         



                                                        [phone]         



                                                        009             

 

        2019 Emergency         [facility]    1.2.398.104 [id number]

8876 [facility]



        08:59:24 11:18:00                 Danny Johnson 350.1.13.10         

ity of



                                                [place] 4.2.7.2.686         [facility]  [phone]         Medi

lennox



                                                        084             [place]

 

        2019 Emergency         GALEN Burkett    1.2.194.578 [id number]

8876 



        08:59:24 11:18:00                 Danny Johnson 350.1.13.10         



                                                [place] 4.2.7.2.686         



                                                [place]  [phone]         



                                                        084             







Results







           Test Description Test Time  Test Comments Results    Result Comments 

Source









                    POCT URINALYSIS W/O SPECIFIC GRAVITY 2023 19:58:00 









                      Test Item  Value      Reference Range Interpretation Comme

nts









             POCT PH U (test code = 3254) 6 mg/dl      5-8                      

 

 

             POCT U LEUK EST (test code = 3263) Negative     Negative - Negative

              

 

             POCT U NIT (test code = 3262) Negative     Negative - Negative     

         

 

             POCT U PROT (test code = 3259) Negative     Negative - Negative    

          

 

             POCT U GLU (test code = 3256) Normal       Negative - Negative     

         

 

             POCT U KETONE (test code = 3258) Negative     Negative - Negative  

            

 

             POCT U BLD (test code = 3257)              Negative - Negative     

         



[facility] URINALYSIS W SPECIFIC [id number] 
20:05:00





             Test Item    Value        Reference Range Interpretation Comments

 

             POCT U SP GRAV (test 1.030 mg/dl  1.005-1.025  A            



             code = 3255)                                        

 

             POCT PH U (test code = 5 mg/dl      5-8                       



             3254)                                               

 

             POCT U LEUK EST (test trace        Negative -                



             code = 3263)              Negative                  

 

             POCT U NIT (test code negative     Negative -                



             = 3262)                   Negative                  

 

             POCT U PROT (test code trace        Negative -                



             = 3259)                   Negative                  

 

             POCT U GLU (test code              Negative -                



             = 3256)                   Negative                  

 

             POCT U KETONE (test ++ mod       Negative -                



             code = 3258)              Negative                  

 

             POCT U UROBILI (test normal       0.2-1                     



             code = 3260)                                        

 

             POCT U BILI (test code negative     Negative -                



             = 3261)                   Negative                  

 

             POCT U BLD (test code about 250Ery/uL Negative -                



             = 3257)                   Negative                  

 

             POCT U COLOR (test sanchez                                  



             code = 3266)                                        

 

             POCT U APPEAR (test clear                                  



             code = 3267)                                        

 

             DIRK (test code = DIRK) accurate development                         

  



                          and interpretation of                           



                          all internal controls                           

 

             Lab Interpretation Abnormal                               



             (test code = 50833-6)                                        



[facility] URINALYSIS W SPECIFIC [id number] 
20:05:00





             Test Item    Value        Reference Range Interpretation Comments

 

             POCT U SP GRAV (test 1.030 mg/dl  1.005-1.025  A            



             code = 3255)                                        

 

             POCT PH U (test code = 5 mg/dl      5-8                       



             3254)                                               

 

             POCT U LEUK EST (test trace        Negative -                



             code = 3263)              Negative                  

 

             POCT U NIT (test code negative     Negative -                



             = 3262)                   Negative                  

 

             POCT U PROT (test code trace        Negative -                



             = 3259)                   Negative                  

 

             POCT U GLU (test code              Negative -                



             = 3256)                   Negative                  

 

             POCT U KETONE (test ++ mod       Negative -                



             code = 3258)              Negative                  

 

             POCT U UROBILI (test normal       0.2-1                     



             code = 3260)                                        

 

             POCT U BILI (test code negative     Negative -                



             = 3261)                   Negative                  

 

             POCT U BLD (test code about 250Ery/uL Negative -                



             = 3257)                   Negative                  

 

             POCT U COLOR (test sanchez                                  



             code = 3266)                                        

 

             POCT U APPEAR (test clear                                  



             code = 3267)                                        

 

             DIRK (test code = DIRK) accurate development                         

  



                          and interpretation of                           



                          all internal controls                           

 

             Lab Interpretation Abnormal                               



             (test code = 24330-5)                                        



[facility] URINALYSIS W SPECIFIC [id number] 
20:05:00





             Test Item    Value        Reference Range Interpretation Comments

 

             POCT U SP GRAV (test 1.030 mg/dl  1.005-1.025  A            



             code = 3255)                                        

 

             POCT PH U (test code = 5 mg/dl      5-8                       



             3254)                                               

 

             POCT U LEUK EST (test trace        Negative -                



             code = 3263)              Negative                  

 

             POCT U NIT (test code negative     Negative -                



             = 3262)                   Negative                  

 

             POCT U PROT (test code trace        Negative -                



             = 3259)                   Negative                  

 

             POCT U GLU (test code              Negative -                



             = 3256)                   Negative                  

 

             POCT U KETONE (test ++ mod       Negative -                



             code = 3258)              Negative                  

 

             POCT U UROBILI (test normal       0.2-1                     



             code = 3260)                                        

 

             POCT U BILI (test code negative     Negative -                



             = 3261)                   Negative                  

 

             POCT U BLD (test code about 250Ery/uL Negative -                



             = 3257)                   Negative                  

 

             POCT U COLOR (test sanchez                                  



             code = 3266)                                        

 

             POCT U APPEAR (test clear                                  



             code = 3267)                                        

 

             DIRK (test code = DIKR) accurate development                         

  



                          and interpretation of                           



                          all internal controls                           

 

             Lab Interpretation Abnormal                               



             (test code = 16398-4)                                        



[facility] URINALYSIS W/O SPECIFIC [id number]
 16:55:00





             Test Item    Value        Reference Range Interpretation Comments

 

             POCT PH U (test code = 3254) N/A          5-8                      

 

 

             POCT U LEUK EST (test code = N/A          Negative - Negative      

        



             3263)                                               

 

             POCT U NIT (test code = 3262) N/A          Negative - Negative     

         

 

             POCT U PROT (test code = 3259) Negative     Negative - Negative    

          

 

             POCT U GLU (test code = 3256) Negative     Negative - Negative     

         

 

             POCT U KETONE (test code = 3258) N/A          Negative - Negative  

            

 

             POCT U BLD (test code = 3257) N/A          Negative - Negative     

         



[facility] URINALYSIS W/O SPECIFIC [id number]
 19:47:00





             Test Item    Value        Reference Range Interpretation Comments

 

             POCT PH U (test code = 3254) n/a          5-8                      

 

 

             POCT U LEUK EST (test code = n/a          Negative - Negative      

        



             3263)                                               

 

             POCT U NIT (test code = 3262) n/a          Negative - Negative     

         

 

             POCT U PROT (test code = 3259) Negative     Negative - Negative    

          

 

             POCT U GLU (test code = 3256) Normal       Negative - Negative     

         

 

             POCT U KETONE (test code = 3258) n/a          Negative - Negative  

            

 

             POCT U BLD (test code = 3257) n/a          Negative - Negative     

         



[facility] PREGNANCY [id number] 14:50:00





             Test Item    Value        Reference Range Interpretation Comments

 

             POCT PREG (test code = 1605) Positive                              

 

 

             On board controls acceptable with C Yes                            

        



             Line (test code = 3574)                                        

 

             POCT PREG LOT # (test code = 3575)                                 

       

 

             POCT PREG TEST  DATE (test                                   

     



             code = 3576)                                        



[facility] URINALYSIS W/O SPECIFIC [id number]
 14:50:00





             Test Item    Value        Reference Range Interpretation Comments

 

             POCT PH U (test code = 3254) n/a          5-8                      

 

 

             POCT U LEUK EST (test code = n/a          Negative - Negative      

        



             3263)                                               

 

             POCT U NIT (test code = 3262) n/a          Negative - Negative     

         

 

             POCT U PROT (test code = 3259) Negative     Negative - Negative    

          

 

             POCT U GLU (test code = 3256) Normal       Negative - Negative     

         

 

             POCT U KETONE (test code = 3258) n/a          Negative - Negative  

            

 

             POCT U BLD (test code = 3257) n/a          Negative - Negative     

         



[id number]01





             Test Item    Value        Reference Range Interpretation Comments

 

             POCT Molecular Strep (test code = Negative     Negative            

      



             52642-7)                                            

 

             Lab Interpretation (test code = Normal                             

    



             58563-8)                                            



[facility]

## 2023-02-05 NOTE — ER
Nurse's Notes                                                                                     

 [facility]                                                                 

Name: Moni Zuñiga                                                                              

Age: 22 yrs                                                                                       

Sex: Female                                                                                       

: 2000                                                                                   

MRN: [id number]                                                                                   

Arrival Date: 2023                                                                          

Time: 18:26                                                                                       

Account#: [id number]                                                                            

Bed 26                                                                                            

Private MD:                                                                                       

Diagnosis: Other specified pregnancy related conditions, second trimester;Abdominal pain,         

  unspecified                                                                                     

                                                                                                  

Presentation:                                                                                     

                                                                                             

18:34 Chief complaint: Patient states: L flank pain and voiding more than usual for 2 weeks.  ll1 

      Saw OB/Gyn, no word yet on UA result. Today, has severe L flank pain that causes            

      nausea. No fever. Coronavirus screen: Client denies travel out of the [place] in the last      

      14 days. At this time, the client does not indicate any symptoms associated with            

      coronavirus-19. Ebola Screen: Patient denies travel to an Ebola-affected area in the 21     

      days before illness onset. Initial Sepsis Screen: Does the patient meet any 2 criteria?     

      HR > 90 bpm. No. Patient's initial sepsis screen is negative. Does the patient have a       

      suspected source of infection? Yes: Acute abdominal pain. Risk Assessment: Do you want      

      to hurt yourself or someone else? Patient reports no desire to harm self or others.         

      Onset of symptoms was 2023.                                                     

18:34 Method Of Arrival: Ambulatory                                                           [facility] 

18:34 Acuity: SKYLA 3                                                                           ll1 

                                                                                                  

Historical:                                                                                       

- Allergies:                                                                                      

18:36 No Known Allergies;                                                                     ll1 

- PMHx:                                                                                           

18:36 None;                                                                                   ll1 

- PSHx:                                                                                           

18:36  section;                                                                       ll1 

                                                                                                  

- Immunization history:: Client reports having NOT received the Covid vaccine.                    

- Social history:: Smoking status: Patient denies any tobacco usage or history of.                

                                                                                                  

                                                                                                  

Screenin:25 [facility] ED Fall Risk Assessment (Adult) History of falling in the last 3 months,       lg3 

      including since admission No falls in past 3 months (0 pts). Abuse screen: Denies           

      threats or abuse. Denies injuries from another. Nutritional screening: No deficits          

      noted. Tuberculosis screening: No symptoms or risk factors identified.                      

                                                                                                  

Assessment:                                                                                       

21:25 General: Appears in no apparent distress. comfortable, Behavior is calm, cooperative.   lg3 

      Pain: Complains of pain in left flank. Neuro: No deficits noted. Ding                   

      Agitation-Sedation Scale (RASS): 0 - Alert and Calm Level of Consciousness is awake,        

      alert, obeys commands, Oriented to person, place, time, situation. Cardiovascular: No       

      deficits noted. Denies chest pain, shortness of breath, Capillary refill < 3 seconds        

      Clubbing of nail beds is absent JVD is absent Patient's skin is warm and dry.               

      Respiratory: No deficits noted. Airway is patent Trachea midline Respiratory effort is      

      even, unlabored, Respiratory pattern is regular, symmetrical. GI: Abdomen is round          

      non-distended. : No deficits noted. No signs and/or symptoms were reported regarding      

      the genitourinary system. EENT: No deficits noted. No signs and/or symptoms were            

      reported regarding the EENT system. Derm: No deficits noted. No signs and/or symptoms       

      reported regarding the dermatologic system. Skin is intact, is healthy with good            

      turgor, Skin is dry, Skin is normal. Musculoskeletal: No deficits noted. No signs           

      and/or symptoms reported regarding the musculoskeletal system. Circulation, motion, and     

      sensation intact. Range of motion: intact in all extremities.                               

22:38 Reassessment: Patient appears in no apparent distress at this time. No changes from     lg3 

      previously documented assessment. Patient and/or family updated on plan of care and         

      expected duration. Pain level reassessed. Patient is alert, oriented x 3, equal             

      unlabored respirations, skin warm/dry/pink.                                                 

22:55 Reassessment: Patient appears in no apparent distress at this time. No changes from     lg3 

      previously documented assessment. Patient and/or family updated on plan of care and         

      expected duration. Pain level reassessed. Patient is alert, oriented x 3, equal             

      unlabored respirations, skin warm/dry/pink. Patient states symptoms have improved.          

                                                                                                  

Vital Signs:                                                                                      

18:34  / 86; Pulse 98; Resp 20; Temp 98.2; Pulse Ox 100% ; Weight 103.42 kg; Height 5   ll1 

      ft. 3 in. (160.02 cm); Pain 5/10;                                                           

21:06  / 48; Pulse 81; Resp 23; Temp 98.5; Pulse Ox 99% on R/A; Weight 103.42 kg;       rv1 

      Height 5 ft. 3 in. (160.02 cm); Pain 5/10;                                                  

22:38  / 84; Pulse 86; Resp 19 S; Pulse Ox 100% on R/A;                                 lg3 

21:06 Body Mass Index 40.39 (103.42 kg, 160.02 cm)                                            rv1 

                                                                                                  

Vitals:                                                                                           

22:36 Fetal Heart Tones 152bpm.                                                               lg3 

                                                                                                  

ED Course:                                                                                        

18:26 Patient arrived in ED.                                                                  as  

18:36 Triage completed.                                                                       ll1 

18:36 Arm band placed on.                                                                     ll1 

19:08 Kane Chester PA is PHCP.                                                                cp  

19:08 Nicholas Ibarra MD is Attending Physician.                                              cp  

19:15 Kane Cespedes MD is Attending Physician.                                             [facility] 

20:23  Rp Exam Complete In Process Unspecified.                                             EDMS

21:25 Patient has correct armband on for positive identification. Placed in gown. Bed in low  lg3 

      position. Call light in reach. Side rails up X 1. Client placed on continuous cardiac       

      and pulse oximetry monitoring. NIBP monitoring applied. Cardiac monitor on. Door            

      closed. Noise minimized. Warm blanket given.                                                

21:25 Inserted saline lock: 22 gauge in left antecubital area, using aseptic technique.       lg3 

22:55 No provider procedures requiring assistance completed. IV discontinued, intact,         lg3 

      bleeding controlled, No redness/swelling at site. Pressure dressing applied.                

                                                                                                  

Administered Medications:                                                                         

21:24 Drug: Zofran (Ondansetron) 4 mg Route: IVP; Site: left antecubital;                     lg3 

22:39 Follow up: Response: No adverse reaction                                                lg3 

21:24 Drug: Dicyclomine 20 mg Route: IM; Site: left gluteus;                                  lg3 

22:39 Follow up: Response: No adverse reaction                                                lg3 

21:24 Drug: NS 0.9% 1000 ml Route: IV; Rate: 1 bolus; Site: left antecubital;                 lg3 

22:39 Follow up: IV Status: Completed infusion; IV Intake: 1000ml                             lg3 

                                                                                                  

                                                                                                  

Medication:                                                                                       

21:25 VIS not applicable for this client.                                                     lg3 

                                                                                                  

Intake:                                                                                           

22:39 IV: 1000ml; Total: 1000ml.                                                              lg3 

                                                                                                  

Outcome:                                                                                          

22:45 Discharge ordered by MD.                                                                cp  

22:55 Discharged to home ambulatory.                                                          lg3 

22:55 Condition: stable                                                                           

22:55 Discharge instructions given to patient, Instructed on discharge instructions, follow       

      up and referral plans. Demonstrated understanding of instructions, follow-up care.          

22:56 Patient left the ED.                                                                    lg3 

                                                                                                  

Signatures:                                                                                       

Dispatcher [employer]                           EDMS                                                 

Kane Cespedes MD MD cha Martinez, Amelia                             as                                                   

Kane Chester PA PA cp Gibson, Lacie, RN                       RN   lg3                                                  

Abebe Casarez, RN                       RN   ll1                                                  

Meño, Clementina                            rv1                                                  

                                                                                                  

**************************************************************************************************

## 2023-02-05 NOTE — EDPHYS
Physician Documentation                                                                           

 [facility]                                                                 

Name: Moni Zuñiga                                                                              

Age: 22 yrs                                                                                       

Sex: Female                                                                                       

: 2000                                                                                   

MRN: [id number]                                                                                   

Arrival Date: 2023                                                                          

Time: 18:26                                                                                       

Account#: [id number]                                                                            

Bed 26                                                                                            

Private MD:                                                                                       

ED Physician Kane Cespedes                                                                      

HPI:                                                                                              

                                                                                             

19:50 This 22 yrs old  Female presents to ER via Ambulatory with complaints of Flank  cp  

      Pain, 16 wks preg.                                                                          

19:50 The patient complains of pain in the left flank.                                        cp  

19:50 The pain radiates to the left back and left abdomen. Onset: The symptoms/episode        cp  

      began/occurred today. Associated signs and symptoms: Pertinent negatives: diarrhea,         

      dysuria, fever, hematuria, pain radiating to the lower extremities, vomiting, vaginal       

      bleeding. Severity of pain: in the emergency department the pain is unchanged. Patient      

      reports she is approximately 16 weeks gestation.                                            

                                                                                                  

Historical:                                                                                       

- Allergies:                                                                                      

18:36 No Known Allergies;                                                                     ll1 

- PMHx:                                                                                           

18:36 None;                                                                                   ll1 

- PSHx:                                                                                           

18:36  section;                                                                       ll1 

                                                                                                  

- Immunization history:: Client reports having NOT received the Covid vaccine.                    

- Social history:: Smoking status: Patient denies any tobacco usage or history of.                

                                                                                                  

                                                                                                  

ROS:                                                                                              

19:55 Constitutional: Negative for body aches, chills, fever, poor PO intake.                 cp  

19:55 Eyes: Negative for injury, pain, redness, and discharge.                                cp  

19:55 ENT: Negative for drainage from ear(s), ear pain, sore throat, difficulty swallowing,       

      difficulty handling secretions.                                                             

19:55 Cardiovascular: Negative for chest pain, palpitations.                                      

19:55 Respiratory: Negative for cough, shortness of breath, wheezing.                             

19:55 Abdomen/GI: Positive for abdominal pain, of the posterior aspect of left lateral            

      abdomen and anterior aspect of left lateral abdomen, Negative for vomiting, diarrhea,       

      constipation.                                                                               

19:55 Back: Positive for flank pain, on the left.                                                 

19:55 : Negative for urinary symptoms, vaginal bleeding, vaginal discharge.                     

19:55 All other systems are negative.                                                             

                                                                                                  

Exam:                                                                                             

20:00 Constitutional: The patient appears in no acute distress, alert, awake, non-toxic, well cp  

      developed, well nourished.                                                                  

20:00 Head/Face:  Normocephalic, atraumatic.                                                  cp  

20:00 Eyes: Periorbital structures: appear normal, Conjunctiva: normal, no exudate, no            

      injection, Sclera: no appreciated abnormality, Lids and lashes: appear normal,              

      bilaterally.                                                                                

20:00 ENT: External ear(s): are unremarkable, Nose: is normal, Posterior pharynx: Airway: no      

      evidence of obstruction, patent, Tonsils: are normal in appearance, Uvula: normal,          

      swelling, is not appreciated.                                                               

20:00 Chest/axilla: Inspection: normal.                                                           

20:00 Cardiovascular: Rate: normal, Rhythm: regular.                                              

20:00 Respiratory: the patient does not display signs of respiratory distress,  Respirations:     

      normal, no use of accessory muscles, no retractions, labored breathing, is not present,     

      Breath sounds: are clear throughout, no decreased breath sounds, no stridor, no             

      wheezing.                                                                                   

20:00 Abdomen/GI: Inspection: abdomen appears normal, Bowel sounds: active, all quadrants,        

      Palpation: soft, in all quadrants, moderate abdominal tenderness, in the posterior          

      aspect of left lateral abdomen, anterior aspect of left lateral abdomen and left upper      

      quadrant, rebound tenderness, is not appreciated, involuntary guarding, is not              

      appreciated.                                                                                

20:00 Skin: no rash present.                                                                      

                                                                                                  

Vital Signs:                                                                                      

18:34  / 86; Pulse 98; Resp 20; Temp 98.2; Pulse Ox 100% ; Weight 103.42 kg; Height 5   ll1 

      ft. 3 in. (160.02 cm); Pain 5/10;                                                           

21:06  / 48; Pulse 81; Resp 23; Temp 98.5; Pulse Ox 99% on R/A; Weight 103.42 kg;       rv1 

      Height 5 ft. 3 in. (160.02 cm); Pain 5/10;                                                  

22:38  / 84; Pulse 86; Resp 19 S; Pulse Ox 100% on R/A;                                 lg3 

21:06 Body Mass Index 40.39 (103.42 kg, 160.02 cm)                                            rv1 

                                                                                                  

MDM:                                                                                              

19:15 Patient medically screened.                                                             [facility] 

                                                                                             

14:34 Data reviewed: vital signs, nurses notes. Consideration of Admission/Observation        cp  

      Escalation of care including admission/observation considered. I considered the             

      following discharge prescriptions or medication management in the emergency department      

      Medications were administered in the Emergency Department. See MAR. Test considered but     

      Not performed: CT: abdomen/pelvis. Counseling: I had a detailed discussion with the         

      patient and/or guardian regarding: the historical points, exam findings, and any            

      diagnostic results supporting the discharge/admit diagnosis, lab results, radiology         

      results, to return to the emergency department if symptoms worsen or persist or if          

      there are any questions or concerns that arise at home.                                     

                                                                                                  

                                                                                             

18:49 Order name: Urine Dipstick-Ancillary; Complete Time: 19:49                              EDMS

                                                                                             

19:49 Interpretation: Normal except: UBLD Trace-intact.                                       cp  

                                                                                             

19:12 Order name: Urine Pregnancy--Ancillary (enter results); Complete Time: 19:49            zm  

                                                                                             

21:12 Interpretation: Normal except: USPGR PREG >1.030.                                       cp  

                                                                                             

19:50 Order name: CBC with Diff; Complete Time: 21:11                                         cp  

                                                                                             

21:11 Interpretation: Normal except: WBC 11.10; YENIFER% 73.8; NEUT A 8.2.                        cp  

                                                                                             

19:50 Order name: CMP; Complete Time: 21:11                                                   cp  

                                                                                             

21:12 Interpretation: Normal except: ALB 3.1; GLOB 4.2; A/G 0.7.                              cp  

                                                                                             

19:50 Order name: Lipase; Complete Time: 21:11                                                cp  

                                                                                             

19:50 Order name: US Rp Exam Complete; Complete Time: 20:33                                   cp  

                                                                                             

19:50 Order name: IV Saline Lock; Complete Time: 20:37                                        cp  

                                                                                             

19:50 Order name: Labs collected and sent; Complete Time: 20:37                               cp  

                                                                                             

20:34 Order name: FHT's; Complete Time: 22:36                                                 cp  

                                                                                                  

Administered Medications:                                                                         

                                                                                             

21:24 Drug: Zofran (Ondansetron) 4 mg Route: IVP; Site: left antecubital;                     lg3 

22:39 Follow up: Response: No adverse reaction                                                lg3 

21:24 Drug: Dicyclomine 20 mg Route: IM; Site: left gluteus;                                  lg3 

22:39 Follow up: Response: No adverse reaction                                                lg3 

21:24 Drug: NS 0.9% 1000 ml Route: IV; Rate: 1 bolus; Site: left antecubital;                 lg3 

22:39 Follow up: IV Status: Completed infusion; IV Intake: 1000ml                             lg3 

                                                                                                  

                                                                                                  

Disposition Summary:                                                                              

23 22:45                                                                                    

Discharge Ordered                                                                                 

      Location: Home                                                                          cp  

      Problem: new                                                                            cp  

      Symptoms: have improved                                                                 cp  

      Condition: Stable                                                                       cp  

      Diagnosis                                                                                   

        - Other specified pregnancy related conditions, second trimester                      cp  

        - Abdominal pain, unspecified                                                         cp  

      Followup:                                                                               cp  

        - With: Private Physician                                                                  

        - When: 2 - 3 days                                                                         

        - Reason: Recheck today's complaints                                                       

      Discharge Instructions:                                                                     

        - Discharge Summary Sheet                                                             cp  

        - Abdominal Pain During Pregnancy                                                     cp  

      Forms:                                                                                      

        - Medication Reconciliation Form                                                      cp  

        - Thank You Letter                                                                    cp  

        - Antibiotic Education                                                                cp  

        - Prescription Opioid Use                                                             cp  

Signatures:                                                                                       

Dispatcher [employer]                           EDKane Martinez MD MD cha Page, Corey, PA PA cp Gibson, Lacie RN                       RN   lg3                                                  

Abebe Casarez RN                       RN   ll1                                                  

                                                                                                  

**************************************************************************************************